# Patient Record
Sex: FEMALE | Race: WHITE
[De-identification: names, ages, dates, MRNs, and addresses within clinical notes are randomized per-mention and may not be internally consistent; named-entity substitution may affect disease eponyms.]

---

## 2018-04-29 ENCOUNTER — HOSPITAL ENCOUNTER (INPATIENT)
Dept: HOSPITAL 25 - ED | Age: 83
LOS: 4 days | Discharge: NURSING, REHAB OR LONG-TERM CARE | DRG: 481 | End: 2018-05-03
Attending: HOSPITALIST | Admitting: HOSPITALIST
Payer: MEDICARE

## 2018-04-29 DIAGNOSIS — Z87.820: ICD-10-CM
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
## 2018-04-29 DIAGNOSIS — F03.90: ICD-10-CM

## 2018-04-29 DIAGNOSIS — D62: ICD-10-CM

## 2018-04-29 DIAGNOSIS — I10: ICD-10-CM

## 2018-04-29 DIAGNOSIS — S72.22XA: Primary | ICD-10-CM

## 2018-04-29 DIAGNOSIS — Z72.89: ICD-10-CM

## 2018-04-29 DIAGNOSIS — Z66: ICD-10-CM

## 2018-04-29 DIAGNOSIS — E11.9: ICD-10-CM

## 2018-04-29 DIAGNOSIS — R42: ICD-10-CM
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
## 2018-04-29 DIAGNOSIS — I07.1: ICD-10-CM

## 2018-04-29 DIAGNOSIS — N17.9: ICD-10-CM

## 2018-04-29 LAB
BASOPHILS # BLD AUTO: 0.1 10^3/UL (ref 0–0.2)
EOSINOPHIL # BLD AUTO: 0.2 10^3/UL (ref 0–0.6)
HCT VFR BLD AUTO: 34 % (ref 35–47)
HGB BLD-MCNC: 10.9 G/DL (ref 12–16)
LYMPHOCYTES # BLD AUTO: 0.8 10^3/UL (ref 1–4.8)
MCH RBC QN AUTO: 19 PG (ref 27–31)
MCHC RBC AUTO-ENTMCNC: 32 G/DL (ref 31–36)
MCV RBC AUTO: 58 FL (ref 80–97)
MONOCYTES # BLD AUTO: 0.7 10^3/UL (ref 0–0.8)
NEUTROPHILS # BLD AUTO: 6.6 10^3/UL (ref 1.5–7.7)
NRBC # BLD AUTO: 0 10^3/UL
NRBC BLD QL AUTO: 0.1
PLATELET # BLD AUTO: 190 10^3/UL (ref 150–450)
RBC # BLD AUTO: 5.79 10^6/UL (ref 4–5.4)
WBC # BLD AUTO: 8.4 10^3/UL (ref 3.5–10.8)

## 2018-04-29 PROCEDURE — 86850 RBC ANTIBODY SCREEN: CPT

## 2018-04-29 PROCEDURE — 72170 X-RAY EXAM OF PELVIS: CPT

## 2018-04-29 PROCEDURE — 83036 HEMOGLOBIN GLYCOSYLATED A1C: CPT

## 2018-04-29 PROCEDURE — 86922 COMPATIBILITY TEST ANTIGLOB: CPT

## 2018-04-29 PROCEDURE — 86900 BLOOD TYPING SEROLOGIC ABO: CPT

## 2018-04-29 PROCEDURE — 85610 PROTHROMBIN TIME: CPT

## 2018-04-29 PROCEDURE — 93306 TTE W/DOPPLER COMPLETE: CPT

## 2018-04-29 PROCEDURE — 76001: CPT

## 2018-04-29 PROCEDURE — 82728 ASSAY OF FERRITIN: CPT

## 2018-04-29 PROCEDURE — 36415 COLL VENOUS BLD VENIPUNCTURE: CPT

## 2018-04-29 PROCEDURE — 83550 IRON BINDING TEST: CPT

## 2018-04-29 PROCEDURE — 83735 ASSAY OF MAGNESIUM: CPT

## 2018-04-29 PROCEDURE — 85730 THROMBOPLASTIN TIME PARTIAL: CPT

## 2018-04-29 PROCEDURE — 83540 ASSAY OF IRON: CPT

## 2018-04-29 PROCEDURE — 80048 BASIC METABOLIC PNL TOTAL CA: CPT

## 2018-04-29 PROCEDURE — 93005 ELECTROCARDIOGRAM TRACING: CPT

## 2018-04-29 PROCEDURE — 99284 EMERGENCY DEPT VISIT MOD MDM: CPT

## 2018-04-29 PROCEDURE — 80053 COMPREHEN METABOLIC PANEL: CPT

## 2018-04-29 PROCEDURE — 85025 COMPLETE CBC W/AUTO DIFF WBC: CPT

## 2018-04-29 PROCEDURE — C1776 JOINT DEVICE (IMPLANTABLE): HCPCS

## 2018-04-29 PROCEDURE — C1713 ANCHOR/SCREW BN/BN,TIS/BN: HCPCS

## 2018-04-29 PROCEDURE — 84484 ASSAY OF TROPONIN QUANT: CPT

## 2018-04-29 PROCEDURE — 86901 BLOOD TYPING SEROLOGIC RH(D): CPT

## 2018-04-29 PROCEDURE — 85060 BLOOD SMEAR INTERPRETATION: CPT

## 2018-04-29 PROCEDURE — 83880 ASSAY OF NATRIURETIC PEPTIDE: CPT

## 2018-04-29 PROCEDURE — P9040 RBC LEUKOREDUCED IRRADIATED: HCPCS

## 2018-04-29 RX ADMIN — LOSARTAN POTASSIUM SCH MG: 25 TABLET, FILM COATED ORAL at 22:13

## 2018-04-29 RX ADMIN — INSULIN LISPRO SCH UNIT: 100 INJECTION, SOLUTION INTRAVENOUS; SUBCUTANEOUS at 18:01

## 2018-04-29 RX ADMIN — METOPROLOL TARTRATE SCH MG: 25 TABLET, FILM COATED ORAL at 21:46

## 2018-04-29 RX ADMIN — METOPROLOL TARTRATE SCH: 25 TABLET, FILM COATED ORAL at 16:04

## 2018-04-29 RX ADMIN — MORPHINE SULFATE PRN MG: 4 INJECTION INTRAVENOUS at 22:06

## 2018-04-29 RX ADMIN — INSULIN LISPRO SCH UNIT: 100 INJECTION, SOLUTION INTRAVENOUS; SUBCUTANEOUS at 22:10

## 2018-04-29 RX ADMIN — OMEPRAZOLE SCH: 20 CAPSULE, DELAYED RELEASE ORAL at 16:04

## 2018-04-29 NOTE — ED
Lower Extremity





- HPI Summary


HPI Summary: 


Patient is an 82-year-old female who presents emergency department for left hip 

pain.  Patient has a history of dementia and currently lives by herself with 

the help of an aid and family.  Patient reportedly fell going to the mailbox on 

Friday, 3 days ago.  There is no report of head injury or loss of 

consciousness.  Patient was evaluated at [facility] for left hip pain and x-

rays were normal and patient was sent home.  Caregiver who is present states 

that patient has been unable to walk since fall secondary to left hip pain.  

Prior to fall she was ambulatory with or without a walker.  Caregiver states 

that today she was helping patient sit up when she heard a pop in her left hip 

and patient had increased pain.  Symptoms are moderate in severity.  Touching 

hip and walking makes symptoms worse.  Nothing makes symptoms better.  Patient 

received IV morphine and Zofran in the ambulance prior to arrival.








- History of Current Complaint


Chief Complaint: EDExtremityLower


Stated Complaint: HIP PAIN


Time Seen by Provider: 04/29/18 09:34


Hx Obtained From: Patient, Family/Caretaker


Hx From Patient Unobtainable Due To: Dementia


Pain Intensity: 10





- Allergies/Home Medications


Allergies/Adverse Reactions: 


 Allergies











Allergy/AdvReac Type Severity Reaction Status Date / Time


 


No Known Allergies Allergy   Verified 01/29/14 09:03











Home Medications: 


 Home Medications





Losartan TAB* [Cozaar TAB*] 50 mg PO BID 04/29/18 [History Confirmed 04/29/18]


Meclizine HCl [Dramamine Less Drowsy] 25 mg PO TID 04/29/18 [History Confirmed 

04/29/18]


Metoprolol Tartrate TAB* [Lopressor TAB*] 25 mg PO BID 04/29/18 [History 

Confirmed 04/29/18]


Omeprazole CAP* [Prilosec CAP* 20 MG] 40 mg PO DAILY 04/29/18 [History 

Confirmed 04/29/18]


Pioglitazone TAB* [Actos TAB*] 30 mg PO DAILY 04/29/18 [History Confirmed 04/29/ 18]


amLODIPine TAB* [Norvasc 5 mg TAB*] 5 mg PO DAILY 04/29/18 [History Confirmed 04 /29/18]











PMH/Surg Hx/FS Hx/Imm Hx


Previously Healthy: Yes


Infectious Disease History: No


Infectious Disease History: 


   Denies: Traveled Outside the [place] in Last 30 Days





- Social History


Occupation: Retired


Lives: Alone


Alcohol Use: Rare


Substance Use Type: Reports: None


Smoking Status (MU): Never Smoked Tobacco





Review of Systems


Constitutional: Negative


Eyes: Negative


ENT: Negative


Cardiovascular: Negative


Respiratory: Negative


Gastrointestinal: Negative


Genitourinary: Negative


Positive: Other - Left hip and left rib pain


Skin: Negative


Neurological: Negative


All Other Systems Reviewed And Are Negative: Yes





Physical Exam


Triage Information Reviewed: Yes


Vital Signs On Initial Exam: 


 Initial Vitals











Temp Pulse Resp BP Pulse Ox


 


 99.5 F   86   16   158/81   82 


 


 04/29/18 09:25  04/29/18 09:25  04/29/18 09:25  04/29/18 09:25  04/29/18 09:25











Vital Signs Reviewed: Yes


Appearance: Positive: Well-Appearing - Patient lying in bed in no acute 

distress.  Pleasantly confused.


Skin: Positive: Warm, Dry


Head/Face: Positive: Normal Head/Face Inspection


Eyes: Positive: Normal


Respiratory/Lung Sounds: Positive: Clear to Auscultation, Breath Sounds Present

, Other - Pain on palpation to the left lower anterior ribs.


Cardiovascular: Positive: Normal, RRR


Abdomen Description: Positive: Nontender


Musculoskeletal: Positive: Other - Left lower extremity is shortened and 

externally rotated.  Good palpable pedal pulse.





Diagnostics





- Vital Signs


 Vital Signs











  Temp Pulse Resp BP Pulse Ox


 


 04/29/18 09:53      95


 


 04/29/18 09:25  99.5 F  86  16  158/81  82














- Laboratory


Lab Results: 


 Lab Results











  04/29/18 04/29/18 04/29/18 Range/Units





  10:07 10:07 10:07 


 


WBC    8.4  (3.5-10.8)  10^3/ul


 


RBC    5.79 H  (4.0-5.4)  10^6/ul


 


Hgb    10.9 L  (12.0-16.0)  g/dl


 


Hct    34 L  (35-47)  %


 


MCV    58 L  (80-97)  fL


 


MCH    19 L  (27-31)  pg


 


MCHC    32  (31-36)  g/dl


 


RDW    17 H  (10.5-15)  %


 


Plt Count    Pending  


 


MPV    Pending  


 


Neut % (Auto)    Pending  


 


Lymph % (Auto)    Pending  


 


Mono % (Auto)    Pending  


 


Eos % (Auto)    Pending  


 


Baso % (Auto)    Pending  


 


Absolute Neuts (auto)    Pending  


 


Absolute Lymphs (auto)    Pending  


 


Absolute Monos (auto)    Pending  


 


Absolute Eos (auto)    Pending  


 


Absolute Basos (auto)    Pending  


 


Absolute Nucleated RBC    Pending  


 


Nucleated RBC %    Pending  


 


APTT  31.3    (26.0-36.3)  seconds


 


Sodium   136 L   (139-145)  mmol/L


 


Potassium   3.8   (3.5-5.0)  mmol/L


 


Chloride   104   (101-111)  mmol/L


 


Carbon Dioxide   24   (22-32)  mmol/L


 


Anion Gap   8   (2-11)  mmol/L


 


BUN   19   (6-24)  mg/dL


 


Creatinine   0.83   (0.51-0.95)  mg/dL


 


Est GFR ( Amer)   84.9   (>60)  


 


Est GFR (Non-Af Amer)   66.0   (>60)  


 


BUN/Creatinine Ratio   22.9 H   (8-20)  


 


Glucose   150 H   ()  mg/dL


 


Calcium   8.6   (8.6-10.3)  mg/dL


 


Total Bilirubin   0.80   (0.2-1.0)  mg/dL


 


AST   14   (13-39)  U/L


 


ALT   9   (7-52)  U/L


 


Alkaline Phosphatase   103   ()  U/L


 


Troponin I   0.01   (<0.04)  ng/mL


 


Total Protein   6.7   (6.4-8.9)  g/dL


 


Albumin   3.2   (3.2-5.2)  g/dL


 


Globulin   3.5   (2-4)  g/dL


 


Albumin/Globulin Ratio   0.9 L   (1-3)  











Result Diagrams: 


 04/29/18 10:07





 04/29/18 10:07


Lab Statement: Any lab studies that have been ordered have been reviewed, and 

results considered in the medical decision making process.





Lower Extremity Course/Dx





- Course


Course Of Treatment: Patient present with ongoing left hip pain and deformity 

after fall that occurred on Friday.  She is afebrile with stable vital signs.  

Patient received IV morphine en route to the ER.  Basic workup and x-rays were 

ordered.  Patient's caregiver who is present states she is at her baseline 

mental status.  Labs are unremarkable.  X-ray of the left hip shows a proximal 

femur fracture.  I spoke with on-call orthopedics, Dr. Winters, and he plans on 

taking patient to or tomorrow if she clears medically.  I spoke with hospitalist

, Dr. Winters, and pt. has been accepted to his service.





- Diagnoses


Differential Diagnosis/HQI/PQRI: Positive: Arthritis, Contusion, Dislocation, 

Fracture (Closed), Sciatica, Sprain, Strain


Provider Diagnoses: 


 Femur fracture








Discharge





- Sign-Out/Discharge


Documenting (check all that apply): Discharge/Admit/Transfer





- Discharge Plan


Condition: Stable


Disposition: ADMITTED TO [facility]





- Billing Disposition and Condition


Condition: STABLE


Disposition: [facility]P-[facility] [place] [facility]

## 2018-04-29 NOTE — RAD
Indication: LEFT femur pain post fall.



Comparison: No relevant prior exams available on the [facility] PACS for comparison.



Technique: AP pelvis and AP and oblique lateral views of the LEFT femur. Crosstable

lateral view femur at the hip.



Report: Subtrochanteric diaphyseal fracture of the LEFT femur with varus and apex anterior

angulation. Diffuse decreased bone density without suggestion of a predisposing focal

pathologic lesion at the intratrochanteric region of the femur. Small bone island at the

intratrochanteric region. Surrounding soft tissue swelling proximal to mid thigh. The LEFT

hip remains normally located.



No pelvic fracture or pelvic joint diastases.



IMPRESSION: Subtrochanteric diaphyseal fracture of the LEFT femur with varus and apex

anterior angulation.

## 2018-04-29 NOTE — CONS
CONSULTATION REPORT:

 

DATE OF CONSULT:  04/29/18

 

CHIEF COMPLAINT:  Left femur fracture.

 

HISTORY OF PRESENT ILLNESS:  Nilsa is 82 years old.  She has past medical 
history of hypertension, dementia, and noninsulin-dependent diabetes.  A couple 
of days ago, she had a fall on to a grassy area.  She was taken to [facility] where x- rays were reportedly done and per the information I have 
available to me, these were negative for any issues and so they sent her home.  
She continued to have left hip pain.  This morning, Abiliota her caregiver for 
many years and her power-of-  was noticed that the area was swollen and 
she decided something was wrong and so she brought her back in and a 
subtrochanteric femur fracture was identified. She was in severe pain.  She was 
admitted by the hospitalist service and I was consulted for her orthopedic 
care.  She is not complaining of pain in other areas.

 

PAST MEDICAL HISTORY:  Again taken from her chart.  This is positive for 
dementia, hypertension, diabetes.  Her H and P mentions fall in July 2006, 
which showed a little bit of intracranial bleeding and she was diagnosed with a 
brain contusion.  Repeat MRI of the brain on 07/2017 showed small subdural 
hematoma as well as a small left frontal hemorrhagic contusion and subcutaneous 
subgluteal hematoma on the right occipital lobe.  Repeat CT scan in July 18th 
showed no changes.

 

PAST SURGICAL HISTORY:  Not available to me.

 

MEDICATIONS:  These include:

1.  Omeprazole.

2.  Meclizine.

3.  Metoprolol.

4.  Amlodipine.

5.  Pioglitazone.

6.  Losartan.

 

ALLERGIES:  No known drug allergies.

 

FAMILY HISTORY:  Noncontributory.

 

SOCIAL HISTORY:  She is a former .  She lives with her partner, 
Francisco J.  He has schizophrenia and was recently in the [facility]. She is cared for by a caregiver, Shanta Eaton whose phone number 
is [phone].  As I understand that she has never smoked and does not have 
any issues with substance abuse.

 

REVIEW OF SYSTEMS:  This is largely unobtainable due to her slightly altered 
mental status secondary to the fracture pain and now extensive narcotic pain 
medication.

 

PHYSICAL EXAM:  General:  Awake, but did not really conversive.  Her vital 
signs are 98.5, 89, 143/63, 20, and 97% on room air.  HEENT:  Normocephalic, 
atraumatic. Neck is supple.  She moves it without any apparent pain.  
Respiratory:  She has normal respiratory effort.  Extremities:  The left lower 
extremity is significantly shortened and externally rotated.  She does not 
really comply with the neurological exam, but I do note that just grossly she 
seems to be wiggling her toes.  Skin: Some mild bruising.  No bleeding or 
lacerations.

 

DIAGNOSTIC STUDIES/LAB DATA:  Her white blood cell count 8.4, H and H 10.9 and 
34, platelet count is 190.  APTT is 31.3.  Sodium is 136, creatinine is 0.83, 
glucose is 150.  Liver enzymes are all within normal limits.

 

IMAGING:  X-rays of the pelvis and left femur show a displaced subtrochanteric 
femur fracture.

 

IMPRESSION:  Displaced left subtrochanteric femur fracture.  She likely 
fractured the femur a couple of days ago when she had the fall and now has 
displaced the fracture.

 

PLAN:  She has been admitted by the hospitalist service and appropriately been 
placed on bed rest and is receiving IV hydration and London catheter has been 
placed.  Dr. Tafoya with the hospitalist service is performing her medical 
optimization based off of his note, he says he may consider adding an 
echocardiogram, but otherwise she is medically optimized for surgery tomorrow.  
We will make her n.p.o. at midnight.  The plan will be for close versus open 
reduction of the left femur fracture with long gamma nail internal fixation. It 
sounds like her medical decision maker is Shanta Eaton, her power-of- 
, again the number is [phone].  She does not have any children.  
She does have a longstanding partner, Francisco J, but he has pretty extensive 
psychiatric illness and is recently discharged from the [facility].  We will review from all this in the morning.

 

 [id number] #: [id number]

DELFINA

## 2018-04-29 NOTE — HP
HISTORY AND PHYSICAL:

 

DATE OF ADMISSION:  04/29/18

 

ADMITTING PROVIDER:  Charles Tafoya MD

 

PRIMARY CARE PHYSICIAN:  Yimi Atkinson DO

 

CHIEF COMPLAINT:  Left hip pain.

 

HISTORY OF PRESENT ILLNESS:  Nilsa Mojica is an 82-year-old female with past 
medical history of hypertension, dementia, non-insulin-dependent diabetes 
mellitus, who had a non-observed fall 2 days prior to admission, she was 
walking from paved area into grass area after picking up her mail.  Her partner
, Miriam Harris was quickly on the scene.  She denies any loss of consciousness.
  She was taken to the ED at [facility] and had reportedly x-rays done 
that reportedly did not show any signs of fracture.  She was discharged home.  
She has had reduced mobility, has been taken care of by her care aide, Shanta Beckford, who is at bedside.  Day prior to admission, they attempted to get her 
to the side of the bed and then into her rolling walker, but she was unable to 
support herself and they could not as well. She was assisted to the ground and 
then eventually lifted back into the bed.  On day of admission, Alberta 
attempted to swing the patient's legs to the side of the bed to again try to 
help her sit on the side, but she immediately heard a pop and there was 
exquisite pain in the left hip area.  She was brought [place] [facility] 
and eventually had imaging consistent with femur fracture.  She was referred to 
hospitalist service for admission with planned operational management of her 
subtrochanteric diaphyseal fracture of the left femur with Dr. Winters on 04/30/
18.  The patient attests to 8-9/10 pain, may or may not have gotten some 
morphine with EMS (Alberta was told that she had gotten some at some point).  
She is a poor historian with history of dementia.  She denies history of falls, 
but gets meclizine 3 times a day for chronic dizziness.  Denies chest pain or 
shortness of breath currently, but is fairly immobile, walking slowly with a 
walker.  She has chills, but no fevers.  Chronically, Alberta attests to some 
chronic tenderness to palpation in the left upper quadrant and overall is "very 
sensitive," complaining of pain frequently in various locations.  She denies 
any cardiac history other than high blood pressure for which she is on 3 
agents.  Has never reportedly had an echo, no EKG in our system.

 

PAST MEDICAL HISTORY:  Dementia, high blood pressure, noninsulin-dependent 
diabetes mellitus.  She had a traumatic fall in July 2006, which showed "a 
little bit of intracerebral bleeding."  She was diagnosed with a brain 
contusion.  She had repeat MRI brain on 07/17/06, which showed a small subdural 
hematoma on the right side, small left frontal hemorrhagic contusion, 
subcutaneous subgaleal hematoma of the right occipital lobe.  Repeat CT on 07/18
/06 showed no changes.

 

MEDICATIONS:  Include:

1.  Omeprazole 40 mg daily.

2.  Meclizine 25 mg 3 times a day.

3.  Metoprolol tartrate 25 mg twice a day.

4.  Amlodipine 5 mg once a day.

5.  Pioglitazone 30 mg once a day.

6.  Losartan 50 mg twice a day.

 

ALLERGIES:  No known drug allergies.

 

FAMILY HISTORY:  The patient was raised in a [facility] after given up 
for adoption.  Does not know much about her 6 biological siblings, the one of 
which was in a nursing home and a half-brother who was contacted few years ago 
by Alberta.

 

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 
furniture store operation.  She is a never smoker.  Occasionally had small 
amounts of alcohol, but none recently.  She lives with Francisco J Harris, who is her 
partner who is recently discharged from [facility], history of 
schizophrenia, age 78.  Shanta Beckford is her power of .  She 
reportedly is a DNR/DNI. MOLST form is not present on admission.

 

REVIEW OF SYSTEMS:  A complete 14-point review of systems negative except as 
per HPI.  She is a poor historian overall.  She does have a history of what was 
thought to be perhaps urinary bleeding and had a workup with Dr. Turpin in 
the past including a CT urogram in 2014.  Denies history of colonoscopy as does 
Alberta. She had cholelithiasis on the CT urogram on 01/29/14.

 

                               PHYSICAL EXAMINATION

 

GENERAL:  Moderate distress intermittent, lying in hospital bed.

 

VITAL SIGNS:  Temperature 99.5; pulse rate 78 to 84; respiratory rate 16 to 20; 
initially oxygen sat was 82% on room air, now 92% on 2 L; blood pressure 158/81
, now 124/57.

 

HEENT:  Normocephalic, atraumatic.  Pupils are equal, round, and reactive to 
light. No scleral icterus.

 

NECK:  No cervical lymphadenopathy.  Neck is supple.

 

RESPIRATORY:  Anteriorly clear to auscultation bilaterally with no wheezing, 
rales, or rhonchi.

 

CARDIOVASCULAR:  Regular rate and rhythm.  No murmurs, rubs, or gallops.

 

ABDOMEN:  Soft, nondistended, slightly tender in the left upper quadrant.

 

EXTREMITIES:  Warm, well perfused.  There is foreshortening of the left 
extremity.

 

NEURO:  Sensation intact.  Wiggles both toes.   strength intact.  She is 
oriented to name, but not year or place.  She thinks she is in an apartment 
building. Memory is not great, though she does recognize Alberta by name.

 

SKIN:  No lesions.  No rashes.

 

 LABORATORY DATA:  White count 8.4, hemoglobin 10.9, hematocrit 34, MCV 58, 
platelets 190.  Sodium 136, potassium 3.8, chloride 104, carbon dioxide 24, BUN 
19, creatinine 0.83, glucose 150.  AST 14, ALT 9, alk phos 103.  Troponin 0.01.

 

IMAGING:  Pelvis x-ray demonstrated subtrochanteric diaphyseal fracture of the 
left femur with varus and apex anterior angulation.  Ribs with chest x-ray 
demonstrated no left rib fracture.  There is a 4 mm potentially calcified 
nodule at the periphery of the right lower lung zone, stigmata of COPD, left 
basilar airspace consolidation may represent atelectasis or inflammatory 
infiltrate.  The femur x- ray on the left, which again showed subtrochanteric 
diaphyseal fracture of the left femur with varus and apex anterior angulation.

 

ASSESSMENT AND PLAN:  Nilsa Mojica is an 82-year-old female with history of 
significant dementia, hypertension, non-insulin-dependent diabetes mellitus.  
Her functional status seems somewhat limited.  It is not clear that she can 
achieve 4 METs.  An EKG was obtained, which shows T-wave inversion in V1, 
normal sinus rhythm, normal axis, poor R-wave progression, no ST elevations or 
changes.  I will try to obtain records from Dr. Atkinson' office.  Her last A1c was 
6.3 in November 2017.  We will repeat that.  Her last LDL was 102, HDL was 43.  
We will consider adding an echocardiogram given her limited ability (secondary 
to dementia?) to go upstairs or probably achieve 4 METs of activity given her 
high blood pressure and diabetes.  Otherwise, she is medically optimized for 
surgery with Dr. Winters tomorrow.  We will get point of care glucose testing 
q.a.c. q.h.s. with sliding scale insulin.  Continue her metoprolol, losartan 
and Norvasc.  Hold her pioglitazone.  Continue her omeprazole.  Her medical 
surrogate is Alberta Shakeel. We will have to get the MOLST from home or fill 
out a new one.  She is a DNR/DNI. She can eat a carbohydrate consistent heart-
healthy diet.  Physical therapy will have to be onboard after the surgery.  She 
gets significant 7-day a week help from home health aides, but likely will need 
SNF home going.  She also has a history of what seems like anemia at the ER 
labs with severe microcytosis.  I am adding on iron panel, ferritin level, 
concern for possible thalassemia, but we will await for further records from 
Dr. Atkinson before potentially doing any hemoglobin electrophoresis.  She is being 
admitted to the inpatient status.

 

 

 

[id number] #: [id number]

DELFINA

## 2018-04-29 NOTE — RAD
Indication: LEFT rib pain post fall.



Comparison: No relevant prior exams available on the [facility] PACS for comparison.



Technique: Frontal chest and 2 dedicated LEFT rib views obtained. No oblique LEFT rib view

could be obtained due to limitation in patient positioning.



Report: Elevated lung volumes and moderate prominence of interstitial markings. Suggestion

of a 4 mm potentially calcified nodule at the periphery of the RIGHT lower lung zone. Mild

elevation of the LEFT hemidiaphragm. LEFT basilar airspace consolidation may represent

atelectasis or inflammatory infiltrate. Negative for pleural effusion or pneumothorax.

Cardiomegaly. Unremarkable central pulmonary vasculature. Mildly tortuous thoracic aorta.

Unremarkable soft tissue contours.



IMPRESSION: 

1. No LEFT rib fracture or pneumothorax evident.

2. Suggestion of a 4 mm potentially calcified nodule at the periphery of the RIGHT lower

lung zone. In absence of prior exams to document stability a nonemergent follow-up chest

CT would be suggested for further assessment. The patient may have had previous

radiographs have [facility].

3. Stigmata of chronic obstructive pulmonary disease.

4. LEFT basilar airspace consolidation may represent atelectasis or inflammatory

infiltrate.

## 2018-04-29 NOTE — PN
Progress Note





- Progress Note


Date of Service: 04/29/18


Note: 





Full note dictated.





Displaced L subtrochanteric femur fracture. Dr. Tafoya doing medical 

optimization. It sounds like she will be ready for tomorrow so npo at midnight 

and plan for closed versus open reduction and internal fixation with a long 

gamma nail.

## 2018-04-29 NOTE — RAD
Indication: LEFT femur pain post fall.



Comparison: No relevant prior exams available [place] the [facility] [place].



Technique: AP pelvis and AP and oblique lateral views of the LEFT femur. Crosstable

lateral view femur at the hip.



Report: Subtrochanteric diaphyseal fracture of the LEFT femur with varus and apex anterior

angulation. Diffuse decreased bone density without suggestion of a predisposing focal

pathologic lesion at the intratrochanteric region of the femur. Small bone island at the

intratrochanteric region. Surrounding soft tissue swelling proximal to mid thigh. The LEFT

hip remains normally located.



No pelvic fracture or pelvic joint diastases.



IMPRESSION: Subtrochanteric diaphyseal fracture of the LEFT femur with varus and apex

anterior angulation.

## 2018-04-30 LAB
BASOPHILS # BLD AUTO: 0.1 10^3/UL (ref 0–0.2)
EOSINOPHIL # BLD AUTO: 0.3 10^3/UL (ref 0–0.6)
HCT VFR BLD AUTO: 34 % (ref 35–47)
HGB BLD-MCNC: 10.9 G/DL (ref 12–16)
LYMPHOCYTES # BLD AUTO: 1.4 10^3/UL (ref 1–4.8)
MCH RBC QN AUTO: 19 PG (ref 27–31)
MCHC RBC AUTO-ENTMCNC: 32 G/DL (ref 31–36)
MCV RBC AUTO: 59 FL (ref 80–97)
MONOCYTES # BLD AUTO: 0.9 10^3/UL (ref 0–0.8)
NEUTROPHILS # BLD AUTO: 6.3 10^3/UL (ref 1.5–7.7)
NRBC # BLD AUTO: 0 10^3/UL
NRBC BLD QL AUTO: 0.1
PLATELET # BLD AUTO: 192 10^3/UL (ref 150–450)
RBC # BLD AUTO: 5.67 10^6/UL (ref 4–5.4)
WBC # BLD AUTO: 9 10^3/UL (ref 3.5–10.8)

## 2018-04-30 PROCEDURE — 0QS704Z REPOSITION LEFT UPPER FEMUR WITH INTERNAL FIXATION DEVICE, OPEN APPROACH: ICD-10-PCS | Performed by: ORTHOPAEDIC SURGERY

## 2018-04-30 RX ADMIN — MORPHINE SULFATE PRN MG: 4 INJECTION INTRAVENOUS at 10:27

## 2018-04-30 RX ADMIN — MORPHINE SULFATE PRN MG: 4 INJECTION INTRAVENOUS at 06:35

## 2018-04-30 RX ADMIN — METOPROLOL TARTRATE SCH MG: 25 TABLET, FILM COATED ORAL at 10:23

## 2018-04-30 RX ADMIN — INSULIN LISPRO SCH: 100 INJECTION, SOLUTION INTRAVENOUS; SUBCUTANEOUS at 17:50

## 2018-04-30 RX ADMIN — METOPROLOL TARTRATE SCH: 25 TABLET, FILM COATED ORAL at 21:03

## 2018-04-30 RX ADMIN — OMEPRAZOLE SCH: 20 CAPSULE, DELAYED RELEASE ORAL at 10:16

## 2018-04-30 RX ADMIN — INSULIN LISPRO SCH: 100 INJECTION, SOLUTION INTRAVENOUS; SUBCUTANEOUS at 11:38

## 2018-04-30 RX ADMIN — LOSARTAN POTASSIUM SCH: 25 TABLET, FILM COATED ORAL at 10:15

## 2018-04-30 RX ADMIN — CEFAZOLIN SODIUM SCH MLS/HR: 1 SOLUTION INTRAVENOUS at 21:01

## 2018-04-30 RX ADMIN — MORPHINE SULFATE PRN MG: 4 INJECTION INTRAVENOUS at 03:39

## 2018-04-30 RX ADMIN — AMLODIPINE BESYLATE SCH: 5 TABLET ORAL at 10:15

## 2018-04-30 RX ADMIN — INSULIN LISPRO SCH: 100 INJECTION, SOLUTION INTRAVENOUS; SUBCUTANEOUS at 21:02

## 2018-04-30 RX ADMIN — LOSARTAN POTASSIUM SCH: 25 TABLET, FILM COATED ORAL at 21:03

## 2018-04-30 RX ADMIN — INSULIN LISPRO SCH: 100 INJECTION, SOLUTION INTRAVENOUS; SUBCUTANEOUS at 07:18

## 2018-04-30 NOTE — RAD
INDICATION: Left femur fracture, fall



COMPARISONS: April 29, 2018



TECHNIQUE: Fluoroscopy was provided for a surgical procedure. Total fluoroscopy time is:

80 seconds



FINDINGS: Spot images demonstrate internal fixation of the femur.



IMPRESSION: FLUOROSCOPY WAS PROVIDED FOR A SURGICAL PROCEDURE



CPT II Codes:

## 2018-04-30 NOTE — PN
Hospitalist Progress Note


Date of Service: 04/30/18








ECHO back, preserved EF, abnormal diastolic dysfunction, mild Tricuspid Valve 

Regurg. 


Patient cleared medically for left femur fracture surgery.

## 2018-04-30 NOTE — OP
DATE OF OPERATION:  04/30/18 - ROOM #334

 

DATE OF BIRTH:  [birth year]

 

SURGEON:  Lavell Ibarra MD

 

ASSISTANT:  Ileana Mobley PA-C

 

PRE-OP DIAGNOSIS:  Left subtrochanteric hip fracture.

 

POST-OP DIAGNOSIS:  Left subtrochanteric hip fracture.

 

OPERATIVE PROCEDURE:  TFN nailing, left hip.

 

DESCRIPTION OF PROCEDURE:  The patient was taken to the operating room where 
noninvasive longitudinal traction was applied.  We opened up a 6-cm 
longitudinal incision centered on the greater trochanter.  I divided the deep 
fascia just above the trochanter, passed the starting pin, the starting reamer, 
and passing all through the proximal femur up to the mouth of the femoral 
shaft.  We then passed the guide pin all the way down to the knee area.  We 
measured a 360-cm length nail. We then reamed up to a 13.5 and placed a 12-mm 
diameter by 360 mm length femoral nail.  With the proximal targeting device, we 
were able to pass the guide pin and proximally up into the center of the 
femoral head and an appropriate length spiral screw then was driven up through 
the nail into the center of the head.  We locked this proximally.

 

This area of the wound then was closed with deep #1 Vicryl sutures for the 
fascial incision, some 0 Vicryl sutures for the subcutaneous tissue and staples 
for the skin.

 

Distally, we were able to identify the 2 transverse locking holes and the 
distal plate.  We drilled over using the image intensifier, placing a 42-mm and 
then a 36- mm screw, and proper positioning was noted on the AP and lateral 
views.  These wounds were irrigated and closed with 2-0 Prolene sutures.  
Compression dressing was then applied.

 

BLOOD LOSS:  250 mL.

 

 [id number]/[facility] #: [id number]

[facility]

## 2018-04-30 NOTE — PN
Subjective


Date of Service: 04/30/18


Interval History: 








ECHO back, preserved EF, abnormal diastolic dysfunction, mild Tricuspid Valve 

Regurg. 


Patient cleared medically for left femur fracture surgery. Medically cleared 

for surgery. 


Pain moderately controlled. 











Objective


Active Medications: 








Amlodipine Besylate (Norvasc Tab*)  5 mg PO DAILY [facility]


   Last Admin: 04/30/18 10:15 Dose:  Not Given


Dextrose (D50w Syringe 50 Ml*)  12.5 gm IV PUSH .FOR FS < 60 - SS PRN


   PRN Reason: FS < 60


Cefazolin Sodium/Dextrose (Kefzol 1 Gm In Dextrose Duplex (*))  1 gm in 50 mls 

@ 200 mls/hr IVPB Q8H [facility]


   Stop: 05/01/18 13:14


Insulin Human Lispro (Humalog*)  0 units SUBCUT ACHS [facility]


   PRN Reason: Protocol


   Last Admin: 04/30/18 17:50 Dose:  Not Given


Losartan Potassium (Cozaar Tab*)  50 mg PO BID [facility]


   Last Admin: 04/30/18 10:15 Dose:  Not Given


Metoprolol Tartrate (Lopressor Tab*)  25 mg PO BID [facility]


   Last Admin: 04/30/18 10:23 Dose:  25 mg


Morphine Sulfate (Morphine Vial*)  2 mg IV Q3H PRN


   PRN Reason: PAIN - MILD


   Last Admin: 04/30/18 10:27 Dose:  2 mg


Naloxone HCl (Narcan*)  0.08 mg IV Q2M PRN


   PRN Reason: severe induced resp depression


   Stop: 05/01/18 14:53


Omeprazole (Prilosec Cap*)  40 mg PO DAILY [facility]


   Last Admin: 04/30/18 10:16 Dose:  Not Given


Ondansetron HCl (Zofran Inj*)  4 mg IV Q4H PRN


   PRN Reason: NAUSEA


   Last Admin: 04/29/18 18:05 Dose:  4 mg








 Vital Signs - 8 hr











  04/30/18 04/30/18 04/30/18





  15:36 15:37 15:40


 


Temperature  98.1 F 


 


Pulse Rate 77 75 74


 


Respiratory 16 17 13





Rate   


 


Blood Pressure  146/71 





(mmHg)   


 


O2 Sat by Pulse 87 89 91





Oximetry   














  04/30/18 04/30/18 04/30/18





  15:41 15:45 15:46


 


Temperature   


 


Pulse Rate 75 67 66


 


Respiratory 19 18 10





Rate   


 


Blood Pressure 146/68  123/65





(mmHg)   


 


O2 Sat by Pulse 90 96 95





Oximetry   














  04/30/18 04/30/18 04/30/18





  15:50 15:51 15:55


 


Temperature   


 


Pulse Rate 66 63 66


 


Respiratory 10 18 13





Rate   


 


Blood Pressure  109/51 





(mmHg)   


 


O2 Sat by Pulse 91 91 91





Oximetry   














  04/30/18 04/30/18 04/30/18





  15:56 16:00 16:01


 


Temperature   


 


Pulse Rate 70 69 63


 


Respiratory 11 23 12





Rate   


 


Blood Pressure 110/47  99/44





(mmHg)   


 


O2 Sat by Pulse 90 91 91





Oximetry   














  04/30/18 04/30/18 04/30/18





  16:05 16:13 16:14


 


Temperature   


 


Pulse Rate 62 63 61


 


Respiratory 17 19 18





Rate   


 


Blood Pressure 107/48  





(mmHg)   


 


O2 Sat by Pulse 92 91 91





Oximetry   














  04/30/18 04/30/18 04/30/18





  16:18 16:20 16:25


 


Temperature   


 


Pulse Rate 65 62 64


 


Respiratory 20 17 19





Rate   


 


Blood Pressure 113/71  





(mmHg)   


 


O2 Sat by Pulse 94 93 96





Oximetry   














  04/30/18 04/30/18 04/30/18





  16:30 16:45 16:50


 


Temperature   


 


Pulse Rate 65 63 70


 


Respiratory 17 15 17





Rate   


 


Blood Pressure 106/64  





(mmHg)   


 


O2 Sat by Pulse 90 94 94





Oximetry   














  04/30/18 04/30/18 04/30/18





  16:55 17:00 17:15


 


Temperature   


 


Pulse Rate 61  63


 


Respiratory 13 15 18





Rate   


 


Blood Pressure  115/59 





(mmHg)   


 


O2 Sat by Pulse 91 95 95





Oximetry   














  04/30/18 04/30/18 04/30/18





  17:16 17:30 17:31


 


Temperature   


 


Pulse Rate 59 56 67


 


Respiratory 20 9 16





Rate   


 


Blood Pressure 107/52  105/52





(mmHg)   


 


O2 Sat by Pulse 94 98 98





Oximetry   














  04/30/18 04/30/18 04/30/18





  17:45 17:46 18:07


 


Temperature   97.1 F


 


Pulse Rate 58 62 69


 


Respiratory 18 17 18





Rate   


 


Blood Pressure  107/57 101/54





(mmHg)   


 


O2 Sat by Pulse 98 96 93





Oximetry   














  04/30/18





  19:04


 


Temperature 96.9 F


 


Pulse Rate 65


 


Respiratory 16





Rate 


 


Blood Pressure 94/45





(mmHg) 


 


O2 Sat by Pulse 98





Oximetry 











Oxygen Devices in Use Now: Nasal Cannula


Appearance: NAD


Eyes: No Scleral Icterus, PERRLA


Neck: NL Appearance and Movements; NL JVP, Trachea Midline


Respiratory: Symmetrical Chest Expansion and Respiratory Effort, Clear to 

Auscultation


Cardiovascular: NL Sounds; No Murmurs; No JVD


Abdominal: NL Sounds; No Tenderness; No Distention, No Hepatosplenomegaly


Lymphatic: No Cervical Adenopathy


Extremities: No Edema, No Clubbing, Cyanosis, - - left leg shortened


Skin: No Rash or Ulcers, No Nodules or Sclerosis


Neurological: NL Sensation, - - oriented to name, but not place


Nutrition: Taking PO's


Result Diagrams: 


 04/30/18 05:08





 04/30/18 05:08


Additional Lab and Data: 


 





 Laboratory Results - last 24 hr











  04/29/18 04/30/18 04/30/18





  10:07 05:08 05:08


 


WBC   9.0 


 


RBC   5.67 H 


 


Hgb   10.9 L 


 


Hct   34 L 


 


MCV   59 L 


 


MCH   19 L 


 


MCHC   32 


 


RDW   17 H 


 


Plt Count   192 


 


MPV   9.1 


 


Neut % (Auto)   70.5 


 


Lymph % (Auto)   15.4 L 


 


Mono % (Auto)   10.4 H 


 


Eos % (Auto)   3.1 


 


Baso % (Auto)   0.6 


 


Absolute Neuts (auto)   6.3 


 


Absolute Lymphs (auto)   1.4 


 


Absolute Monos (auto)   0.9 H 


 


Absolute Eos (auto)   0.3 


 


Absolute Basos (auto)   0.1 


 


Absolute Nucleated RBC   0 


 


Nucleated RBC %   0.1 


 


Hem Pathologist Commnt    


 


Sodium    138 L


 


Potassium    4.1


 


Chloride    103


 


Carbon Dioxide    27


 


Anion Gap    8


 


BUN    23


 


Creatinine    0.82


 


Est GFR ( Amer)    86.0


 


Est GFR (Non-Af Amer)    66.9


 


BUN/Creatinine Ratio    28.0 H


 


Glucose    116 H


 


POC Glucose (mg/dL)   


 


Calcium    8.9


 


Iron    29 L


 


TIBC    279


 


% Saturation    10 L


 


Unsat Iron Binding    250


 


Transferrin    199 L


 


Ferritin    147.5














  04/30/18 04/30/18





  12:29 15:39


 


WBC  


 


RBC  


 


Hgb  


 


Hct  


 


MCV  


 


MCH  


 


MCHC  


 


RDW  


 


Plt Count  


 


MPV  


 


Neut % (Auto)  


 


Lymph % (Auto)  


 


Mono % (Auto)  


 


Eos % (Auto)  


 


Baso % (Auto)  


 


Absolute Neuts (auto)  


 


Absolute Lymphs (auto)  


 


Absolute Monos (auto)  


 


Absolute Eos (auto)  


 


Absolute Basos (auto)  


 


Absolute Nucleated RBC  


 


Nucleated RBC %  


 


Hem Pathologist Commnt  


 


Sodium  


 


Potassium  


 


Chloride  


 


Carbon Dioxide  


 


Anion Gap  


 


BUN  


 


Creatinine  


 


Est GFR ( Amer)  


 


Est GFR (Non-Af Amer)  


 


BUN/Creatinine Ratio  


 


Glucose  


 


POC Glucose (mg/dL)  132 H  143 H


 


Calcium  


 


Iron  


 


TIBC  


 


% Saturation  


 


Unsat Iron Binding  


 


Transferrin  


 


Ferritin  




















Assess/Plan/Problems-Billing


Assessment: 





83 yo female PMH severe dementia, HTN, NIDDM, p/w left femur fracture. 

diastolic dysfunction











- Patient Problems


(1) Femur fracture, left


Current Visit: Yes   Status: Acute   Code(s): S72.92XA - UNSP FRACTURE OF LEFT 

FEMUR, INIT ENCNTR FOR CLOSED FRACTURE   SNOMED Code(s): 72933943


   Comment: appreciate ortho assistance


cleared medically for OR. RCRI 0-1(some diastolic dysfunction on ECHO, NIDDM), 

low risk for major cardiac event)


PT


likely to SNF   





(2) Diabetes mellitus


Current Visit: Yes   Status: Acute   Code(s): E11.9 - TYPE 2 DIABETES MELLITUS 

WITHOUT COMPLICATIONS   SNOMED Code(s): 43263828


   Comment: A1C 7.5


POC qachs


holding home pioglitizone


SSI    





(3) HTN (hypertension)


Current Visit: Yes   Status: Acute   Code(s): I10 - ESSENTIAL (PRIMARY) 

HYPERTENSION   SNOMED Code(s): 78909606


   Comment: amlodipine 5mg


metoprolol 25mg BID   





(4) Severe dementia


Current Visit: Yes   Status: Acute   Code(s): F03.90 - UNSPECIFIED DEMENTIA 

WITHOUT BEHAVIORAL DISTURBANCE   SNOMED Code(s): 48962330


   Comment: has home aides 7 days a week.    





(5) Diastolic dysfunction


Current Visit: Yes   Status: Acute   Code(s): I51.9 - HEART DISEASE, 

UNSPECIFIED   SNOMED Code(s): 8234862


   Comment: not in acute exacerbation. now Hx of CHF.    


Status and Disposition: 





medicine inpatient.

## 2018-04-30 NOTE — ECHO
Patient:      NOLA BARROW

Med Rec#:     [id number]            :          1935          

Date:         2018            Age:          82y                 

Account#:     [id number]          Height:       157.48 cm / 62.0 in

Accession#:   [id number]           Weight:       65.77 kg / 145.0 lbs

Sex:          F                     BSA:          1.67

Room#:        334                   

Admit Date#:  2018          

Type:         Inpatient

 

Referring:    Charles Tafoya

Reading:      Mango Craven MD

Sonographer:  Teresita Martinez RDCS

CC:           Yimi Atkinson DO

______________________________________________________________________

 

Transthoracic Echocardiogram

 

Indication:

Abn EKG

BP:           121/65

HR:           88

Rhythm:       NSR

 

Findings     

History:

Prior fall with subdural hematoma and brain

contusion,dementia,HTN,admitted after a fall that fractured a hip. 

 

Technical Comments:

The study is technically difficult.   Completed at 0815. The study was

technically limited due to the patient's inability to lay in the left

lateral decubitus position.  

 

Left Ventricle:

The left ventricular chamber size is decreased. Global left ventricular

wall motion and contractility are within normal limits. There is normal

left ventricular systolic function. The estimated ejection fraction is

55-60%.  Abnormal left ventricular diastolic function is observed. 

 

Left Atrium:

The left atrial chamber size is normal. 

 

Right Ventricle:

The right ventricular cavity size is normal. The right ventricular

global systolic function is normal. 

 

Right Atrium:

The right atrium is not well visualized. 

 

Aortic Valve:

The aortic valve is trileaflet. There is no evidence of aortic

regurgitation. There is no evidence of aortic stenosis. 

 

Mitral Valve:

The mitral valve leaflets are mildly thickened. There is no evidence of

mitral regurgitation. There is no evidence of mitral stenosis. 

 

Tricuspid Valve:

The tricuspid valve leaflets are normal.  There is mild tricuspid

regurgitation. Unable to estimate the right ventricular systolic

pressure.   There is no tricuspid stenosis. 

 

Pulmonic Valve:

The pulmonic valve appears normal in structure and function. The

pulmonic valve appears normal. 

 

Pericardium:

The pericardium appears normal. 

 

Aorta:

There is no dilatation of the ascending aorta. There is no dilatation of

the aortic arch. There is no dilation of the aortic root. 

 

Pulmonary Artery:

The main pulmonary artery appears normal. 

 

Venous:

The venous system is not well visualized. 

 

Conclusions

Global left ventricular wall motion and contractility are within normal

limits.

There is normal left ventricular systolic function.

The estimated ejection fraction is 55-60%. 

The right ventricular global systolic function is normal.

There is no evidence of aortic regurgitation.

There is no evidence of mitral regurgitation.

There is mild tricuspid regurgitation.

Unable to estimate the right ventricular systolic pressure.  

There is no dilatation of the ascending aorta.

 

Measurements     

Name                    Value         Normal Range            

RVIDd (AP) 2D           2.8 cm        (0.9 - 2.6)             

IVSd (2D)               0.7 cm        (0.6 - 1)               

LVPWd (2D)              0.8 cm        (0.6 - 1)               

LVIDd (2D)              3.5 cm        (3.6 - 5.4)             

LVIDs (2D)              2.5 cm        -                        

LV FS (2D)              30 %          (25 - 45)               

Aortic Annulus          1.9 cm        (1.4 - 2.6)             

Ao root diameter (2D)   3.1 cm        (2.1 - 3.5)             

Ascending Ao            3 cm          (2.1 - 3.4)             

Aortic arch             2.2 cm        (1.8 - 3.4)             

Descending Ao           0.4 cm        -                        

LA dimension (AP) 2D    2.6 cm        (2.3 - 3.8)             

 

Name                    Value         Normal Range            

MV E-wave Vmax          0.7 m/sec     -                        

MV deceleration time    252 msec      -                        

MV A-wave Vmax          1.1 m/sec     -                        

MV E:A ratio            0.66 ratio    -                        

LV septal e' Vmax       0.06 m/sec    -                        

LV lateral e' Vmax      0.08 m/sec    -                        

LV E:e' septal ratio    11.66 ratio   -                        

LV E:e' lateral ratio   8.75 ratio    -                        

 

Name                    Value         Normal Range            

AV Vmax                 1.4 m/sec     -                        

AV VTI                  33 cm         -                        

AV peak gradient        7.42 mmHg     -                        

AV mean gradient        3.36 mmHg     -                        

LVOT Vmax               1.2 m/sec     -                        

LVOT VTI                28.3 cm       -                        

LVOT peak gradient      5.8 mmHg      -                        

LVOT mean gradient      2.25 mmHg     -                        

 

Name                    Value         Normal Range            

TR Vmax                 3.1 m/sec     -                        

TR peak gradient        37 mmHg       -                        

 

Name                    Value         Normal Range            

PV Vmax                 0.9 m/sec     -                        

PV peak gradient        2.94 mmHg     -                        

 

Electronically signed by: Mango Craven MD on 2018 08:34:57

## 2018-05-01 LAB
BASOPHILS # BLD AUTO: 0 10^3/UL (ref 0–0.2)
EOSINOPHIL # BLD AUTO: 0 10^3/UL (ref 0–0.6)
HCT VFR BLD AUTO: 27 % (ref 35–47)
HGB BLD-MCNC: 8.5 G/DL (ref 12–16)
LYMPHOCYTES # BLD AUTO: 0.5 10^3/UL (ref 1–4.8)
MCH RBC QN AUTO: 19 PG (ref 27–31)
MCHC RBC AUTO-ENTMCNC: 31 G/DL (ref 31–36)
MCV RBC AUTO: 59 FL (ref 80–97)
MONOCYTES # BLD AUTO: 0.6 10^3/UL (ref 0–0.8)
NEUTROPHILS # BLD AUTO: 7.3 10^3/UL (ref 1.5–7.7)
NRBC # BLD AUTO: 0 10^3/UL
NRBC BLD QL AUTO: 0.1
PLATELET # BLD AUTO: 148 10^3/UL (ref 150–450)
RBC # BLD AUTO: 4.6 10^6/UL (ref 4–5.4)
WBC # BLD AUTO: 8.5 10^3/UL (ref 3.5–10.8)

## 2018-05-01 RX ADMIN — INSULIN LISPRO SCH UNIT: 100 INJECTION, SOLUTION INTRAVENOUS; SUBCUTANEOUS at 18:22

## 2018-05-01 RX ADMIN — OXYCODONE HYDROCHLORIDE AND ACETAMINOPHEN PRN TAB: 5; 325 TABLET ORAL at 13:26

## 2018-05-01 RX ADMIN — INSULIN LISPRO SCH UNIT: 100 INJECTION, SOLUTION INTRAVENOUS; SUBCUTANEOUS at 13:17

## 2018-05-01 RX ADMIN — OMEPRAZOLE SCH MG: 20 CAPSULE, DELAYED RELEASE ORAL at 08:54

## 2018-05-01 RX ADMIN — AMLODIPINE BESYLATE SCH MG: 5 TABLET ORAL at 08:51

## 2018-05-01 RX ADMIN — LOSARTAN POTASSIUM SCH MG: 25 TABLET, FILM COATED ORAL at 08:49

## 2018-05-01 RX ADMIN — ENOXAPARIN SODIUM SCH MG: 30 INJECTION SUBCUTANEOUS at 21:48

## 2018-05-01 RX ADMIN — INSULIN LISPRO SCH UNIT: 100 INJECTION, SOLUTION INTRAVENOUS; SUBCUTANEOUS at 21:30

## 2018-05-01 RX ADMIN — CEFAZOLIN SODIUM SCH MLS/HR: 1 SOLUTION INTRAVENOUS at 13:27

## 2018-05-01 RX ADMIN — POLYETHYLENE GLYCOL 3350 PRN GM: 17 POWDER, FOR SOLUTION ORAL at 08:48

## 2018-05-01 RX ADMIN — INSULIN LISPRO SCH UNIT: 100 INJECTION, SOLUTION INTRAVENOUS; SUBCUTANEOUS at 08:55

## 2018-05-01 RX ADMIN — SODIUM CHLORIDE SCH MLS/HR: 900 IRRIGANT IRRIGATION at 07:40

## 2018-05-01 RX ADMIN — OXYCODONE HYDROCHLORIDE AND ACETAMINOPHEN PRN TAB: 5; 325 TABLET ORAL at 08:49

## 2018-05-01 RX ADMIN — MORPHINE SULFATE PRN MG: 4 INJECTION INTRAVENOUS at 06:13

## 2018-05-01 RX ADMIN — OXYCODONE HYDROCHLORIDE AND ACETAMINOPHEN PRN TAB: 5; 325 TABLET ORAL at 17:37

## 2018-05-01 RX ADMIN — SODIUM CHLORIDE SCH MLS/HR: 900 IRRIGANT IRRIGATION at 21:33

## 2018-05-01 RX ADMIN — METOPROLOL TARTRATE SCH MG: 25 TABLET, FILM COATED ORAL at 08:52

## 2018-05-01 RX ADMIN — SENNOSIDES SCH TAB: 8.6 TABLET, FILM COATED ORAL at 08:52

## 2018-05-01 RX ADMIN — DOCUSATE SODIUM SCH MG: 100 CAPSULE, LIQUID FILLED ORAL at 08:52

## 2018-05-01 RX ADMIN — OXYCODONE HYDROCHLORIDE AND ACETAMINOPHEN PRN TAB: 5; 325 TABLET ORAL at 21:29

## 2018-05-01 RX ADMIN — CEFAZOLIN SODIUM SCH MLS/HR: 1 SOLUTION INTRAVENOUS at 05:20

## 2018-05-01 RX ADMIN — METOPROLOL TARTRATE SCH MG: 25 TABLET, FILM COATED ORAL at 19:38

## 2018-05-01 NOTE — PN
Subjective


Date of Service: 05/01/18


Interval History: 








CRT bumped. IVF started. required cecil with PT. Needs Rehab. Says feels worse 

overall but denied pain. 











Objective


Active Medications: 








Dextrose (D50w Syringe 50 Ml*)  12.5 gm IV PUSH .FOR FS < 60 - SS PRN


   PRN Reason: FS < 60


Docusate Sodium (Colace Cap*)  100 mg PO DAILY [facility]


   Last Admin: 05/01/18 08:52 Dose:  100 mg


Enoxaparin Sodium (Lovenox(*))  30 mg SUBCUT Q24H [facility]


   Last Admin: 05/01/18 21:48 Dose:  30 mg


Sodium Chloride (Ns 0.9% 1000 Ml*)  1,000 mls @ 75 mls/hr IV PER RATE [facility]


   Stop: 05/02/18 03:14


   Last Admin: 05/01/18 21:33 Dose:  75 mls/hr


Insulin Human Lispro (Humalog*)  0 units SUBCUT ACHS [facility]


   PRN Reason: Protocol


   Last Admin: 05/01/18 21:30 Dose:  4 unit


Metoprolol Tartrate (Lopressor Tab*)  25 mg PO BID [facility]


   Last Admin: 05/01/18 19:38 Dose:  25 mg


Morphine Sulfate (Morphine Vial*)  2 mg IV Q3H PRN


   PRN Reason: PAIN - MILD


   Last Admin: 05/01/18 06:13 Dose:  2 mg


Omeprazole (Prilosec Cap*)  40 mg PO DAILY [facility]


   Last Admin: 05/01/18 08:54 Dose:  40 mg


Ondansetron HCl (Zofran Inj*)  4 mg IV Q4H PRN


   PRN Reason: NAUSEA


   Last Admin: 04/29/18 18:05 Dose:  4 mg


Oxycodone/Acetaminophen (Percocet 5/325 Tab*)  1 tab PO Q4H PRN


   PRN Reason: PAIN


   Last Admin: 05/01/18 21:29 Dose:  1 tab


Polyethylene Glycol/Electrolytes (Miralax*)  17 gm PO DAILY PRN


   PRN Reason: CONSTIPATION


   Last Admin: 05/01/18 08:48 Dose:  17 gm


Senna (Senokot Tab*)  1 tab PO DAILY [facility]


   Last Admin: 05/01/18 08:52 Dose:  1 tab








 Vital Signs - 8 hr











  05/01/18 05/01/18 05/01/18





  15:19 15:26 17:37


 


Temperature 97.5 F  


 


Pulse Rate 66  


 


Respiratory 18 18 18





Rate   


 


Blood Pressure 101/37  





(mmHg)   


 


O2 Sat by Pulse 99  





Oximetry   














  05/01/18 05/01/18 05/01/18





  19:24 19:40 19:41


 


Temperature 97.9 F  


 


Pulse Rate 73  


 


Respiratory 18 18 18





Rate   


 


Blood Pressure 100/42  





(mmHg)   


 


O2 Sat by Pulse 99  





Oximetry   














  05/01/18 05/01/18





  20:26 21:29


 


Temperature  


 


Pulse Rate  


 


Respiratory  16





Rate  


 


Blood Pressure  





(mmHg)  


 


O2 Sat by Pulse 99 





Oximetry  











Oxygen Devices in Use Now: Nasal Cannula


Appearance: Well appearing, alert. Notably less distressed.


Eyes: No Scleral Icterus, PERRLA


Ears/Nose/Mouth/Throat: NL Teeth, Lips, Gums, Mucous Membranes Moist


Neck: NL Appearance and Movements; NL JVP, Trachea Midline


Respiratory: Symmetrical Chest Expansion and Respiratory Effort, Clear to 

Auscultation


Cardiovascular: NL Sounds; No Murmurs; No JVD, RRR


Extremities: No Edema, No Clubbing, Cyanosis, - - left hip with gauze w/o 

strikethru. no erthyema or induration. 


Skin: No Rash or Ulcers


Neurological: Alert and Oriented x 3, NL Sensation


Nutrition: Taking PO's


Result Diagrams: 


 05/01/18 04:50





 05/01/18 04:50


Additional Lab and Data: 


 





 


 Laboratory Results - last 24 hr











  04/29/18 04/30/18 05/01/18





  21:56 20:50 04:50


 


WBC    8.5


 


RBC    4.60


 


Hgb    8.5 L


 


Hct    27 L


 


MCV    59 L


 


MCH    19 L


 


MCHC    31


 


RDW    17 H


 


Plt Count    148 L


 


MPV    8.5


 


Neut % (Auto)    86.2 H


 


Lymph % (Auto)    6.2 L


 


Mono % (Auto)    7.5 H


 


Eos % (Auto)    0


 


Baso % (Auto)    0.1


 


Absolute Neuts (auto)    7.3


 


Absolute Lymphs (auto)    0.5 L


 


Absolute Monos (auto)    0.6


 


Absolute Eos (auto)    0


 


Absolute Basos (auto)    0


 


Absolute Nucleated RBC    0


 


Nucleated RBC %    0.1


 


Sodium   


 


Potassium   


 


Chloride   


 


Carbon Dioxide   


 


Anion Gap   


 


BUN   


 


Creatinine   


 


Est GFR ( Amer)   


 


Est GFR (Non-Af Amer)   


 


BUN/Creatinine Ratio   


 


Glucose   


 


POC Glucose (mg/dL)  162 H  94 


 


Calcium   














  05/01/18 05/01/18 05/01/18





  04:50 07:44 11:30


 


WBC   


 


RBC   


 


Hgb   


 


Hct   


 


MCV   


 


MCH   


 


MCHC   


 


RDW   


 


Plt Count   


 


MPV   


 


Neut % (Auto)   


 


Lymph % (Auto)   


 


Mono % (Auto)   


 


Eos % (Auto)   


 


Baso % (Auto)   


 


Absolute Neuts (auto)   


 


Absolute Lymphs (auto)   


 


Absolute Monos (auto)   


 


Absolute Eos (auto)   


 


Absolute Basos (auto)   


 


Absolute Nucleated RBC   


 


Nucleated RBC %   


 


Sodium  134 L  


 


Potassium  4.5  


 


Chloride  102  


 


Carbon Dioxide  27  


 


Anion Gap  5  


 


BUN  32 H  


 


Creatinine  1.10 H  


 


Est GFR ( Amer)  61.3  


 


Est GFR (Non-Af Amer)  47.7  


 


BUN/Creatinine Ratio  29.1 H  


 


Glucose  294 H  


 


POC Glucose (mg/dL)   281 H  276 H


 


Calcium  8.3 L  














  05/01/18





  16:58


 


WBC 


 


RBC 


 


Hgb 


 


Hct 


 


MCV 


 


MCH 


 


MCHC 


 


RDW 


 


Plt Count 


 


MPV 


 


Neut % (Auto) 


 


Lymph % (Auto) 


 


Mono % (Auto) 


 


Eos % (Auto) 


 


Baso % (Auto) 


 


Absolute Neuts (auto) 


 


Absolute Lymphs (auto) 


 


Absolute Monos (auto) 


 


Absolute Eos (auto) 


 


Absolute Basos (auto) 


 


Absolute Nucleated RBC 


 


Nucleated RBC % 


 


Sodium 


 


Potassium 


 


Chloride 


 


Carbon Dioxide 


 


Anion Gap 


 


BUN 


 


Creatinine 


 


Est GFR ( Amer) 


 


Est GFR (Non-Af Amer) 


 


BUN/Creatinine Ratio 


 


Glucose 


 


POC Glucose (mg/dL)  171 H


 


Calcium 























Assess/Plan/Problems-Billing


Assessment: 





83 yo female PMH severe dementia, HTN, NIDDM, p/w left femur fracture s/p ORIF. 

diastolic dysfunction. VERNA. needing rehab. 











- Patient Problems


(1) Femur fracture, left


Current Visit: Yes   Status: Acute   Code(s): S72.92XA - UNSP FRACTURE OF LEFT 

FEMUR, INIT ENCNTR FOR CLOSED FRACTURE   SNOMED Code(s): 71290218


   Comment: appreciate ortho recs. 


POD #1 femoral nail


lovenox dvt ppx. 


PT


likely to SNF   





(2) Diabetes mellitus


Current Visit: Yes   Status: Acute   Code(s): E11.9 - TYPE 2 DIABETES MELLITUS 

WITHOUT COMPLICATIONS   SNOMED Code(s): 52549735


   Comment: A1C 7.5


POC qachs


holding home pioglitizone


SSI    





(3) HTN (hypertension)


Current Visit: Yes   Status: Acute   Code(s): I10 - ESSENTIAL (PRIMARY) 

HYPERTENSION   SNOMED Code(s): 44884585


   Comment: -110s. 


stop amlodipine 5mg, stop losartan 50mg. 


continue metoprolol 25mg BID   





(4) Severe dementia


Current Visit: Yes   Status: Acute   Code(s): F03.90 - UNSPECIFIED DEMENTIA 

WITHOUT BEHAVIORAL DISTURBANCE   SNOMED Code(s): 60098599


   Comment: has home aides 7 days a week.    





(5) Diastolic dysfunction


Current Visit: Yes   Status: Acute   Code(s): I51.9 - HEART DISEASE, 

UNSPECIFIED   SNOMED Code(s): 3218973


   Comment: not in acute exacerbation. no Hx of CHF.    


Status and Disposition: 





medicine inpatient. needs rehab placement.

## 2018-05-01 NOTE — PN
Progress Note





- Progress Note


Date of Service: 05/01/18


SOAP: 


Subjective:


[]Patient seen at bedside. She reports no pain of her LLE at this time though s 

he has been very vocal about pain, yelling out throughout the day. No CP, SOB, 

dizziness, nausea. 





Objective:


[]


 Vital Signs











Temp  97.9 F   05/01/18 19:24


 


Pulse  73   05/01/18 19:24


 


Resp  18   05/01/18 19:41


 


BP  100/42   05/01/18 19:24


 


Pulse Ox  99   05/01/18 20:26








 Intake & Output











 05/01/18 05/01/18 05/02/18





 06:59 18:59 06:59


 


Intake Total 770 585 


 


Output Total 230 150 


 


Balance 540 435 


 


Weight 155 lb  


 


Intake:   


 


  IV Fluids 90  


 


    ABX - CEFAZOLIN 90  


 


  Oral 680 585 


 


Output:   


 


  [place] 230 150 


 


Other:   


 


  # Bowel Movements 0  0








 Laboratory Last Values











WBC  8.5 10^3/ul (3.5-10.8)   05/01/18  04:50    


 


RBC  4.60 10^6/ul (4.0-5.4)   05/01/18  04:50    


 


Hgb  8.5 g/dl (12.0-16.0)  L  05/01/18  04:50    


 


Hct  27 % (35-47)  L  05/01/18  04:50    


 


MCV  59 fL (80-97)  L  05/01/18  04:50    


 


MCH  19 pg (27-31)  L  05/01/18  04:50    


 


MCHC  31 g/dl (31-36)   05/01/18  04:50    


 


RDW  17 % (10.5-15)  H  05/01/18  04:50    


 


Plt Count  148 10^3/ul (150-450)  L  05/01/18  04:50    


 


MPV  8.5 um3 (7.4-10.4)   05/01/18  04:50    


 


Neut % (Auto)  86.2 % (38-83)  H  05/01/18  04:50    


 


Lymph % (Auto)  6.2 % (25-47)  L  05/01/18  04:50    


 


Mono % (Auto)  7.5 % (0-7)  H  05/01/18  04:50    


 


Eos % (Auto)  0 % (0-6)   05/01/18  04:50    


 


Baso % (Auto)  0.1 % (0-2)   05/01/18  04:50    


 


Absolute Neuts (auto)  7.3 10^3/ul (1.5-7.7)   05/01/18  04:50    


 


Absolute Lymphs (auto)  0.5 10^3/ul (1.0-4.8)  L  05/01/18  04:50    


 


Absolute Monos (auto)  0.6 10^3/ul (0-0.8)   05/01/18  04:50    


 


Absolute Eos (auto)  0 10^3/ul (0-0.6)   05/01/18  04:50    


 


Absolute Basos (auto)  0 10^3/ul (0-0.2)   05/01/18  04:50    


 


Absolute Nucleated RBC  0 10^3/ul  05/01/18  04:50    


 


Nucleated RBC %  0.1   05/01/18  04:50    


 


Giant Platelets  Present   04/29/18  10:07    


 


Hypochromasia  2+   04/29/18  10:07    


 


Microcytosis  2+   04/29/18  10:07    


 


Target Cells  1+   04/29/18  10:07    


 


Hem Pathologist Commnt     04/29/18  10:07    


 


APTT  31.3 seconds (26.0-36.3)   04/29/18  10:07    


 


Sodium  134 mmol/L (139-145)  L  05/01/18  04:50    


 


Potassium  4.5 mmol/L (3.5-5.0)   05/01/18  04:50    


 


Chloride  102 mmol/L (101-111)   05/01/18  04:50    


 


Carbon Dioxide  27 mmol/L (22-32)   05/01/18  04:50    


 


Anion Gap  5 mmol/L (2-11)   05/01/18  04:50    


 


BUN  32 mg/dL (6-24)  H  05/01/18  04:50    


 


Creatinine  1.10 mg/dL (0.51-0.95)  H  05/01/18  04:50    


 


Est GFR ( Amer)  61.3  (>60)   05/01/18  04:50    


 


Est GFR (Non-Af Amer)  47.7  (>60)   05/01/18  04:50    


 


BUN/Creatinine Ratio  29.1  (8-20)  H  05/01/18  04:50    


 


Glucose  294 mg/dL ()  H  05/01/18  04:50    


 


POC Glucose (mg/dL)  171 mg/dL ()  H  05/01/18  16:58    


 


Hemoglobin A1c  7.5 % (4.0-5.6)  H  04/29/18  10:07    


 


Calcium  8.3 mg/dL (8.6-10.3)  L  05/01/18  04:50    


 


Magnesium  1.8 mg/dL (1.9-2.7)  L  04/29/18  10:07    


 


Iron  29 ug/dL ()  L  04/30/18  05:08    


 


TIBC  279 mcg/dL (250-450)   04/30/18  05:08    


 


% Saturation  10 % (15-55)  L  04/30/18  05:08    


 


Unsat Iron Binding  250 ug/dL  04/30/18  05:08    


 


Transferrin  199 mg/dL (203-362)  L  04/30/18  05:08    


 


Ferritin  147.5 ng/mL ()   04/30/18  05:08    


 


Total Bilirubin  0.80 mg/dL (0.2-1.0)   04/29/18  10:07    


 


AST  14 U/L (13-39)   04/29/18  10:07    


 


ALT  9 U/L (7-52)   04/29/18  10:07    


 


Alkaline Phosphatase  103 U/L ()   04/29/18  10:07    


 


Troponin I  0.01 ng/mL (<0.04)   04/29/18  10:07    


 


B-Natriuretic Peptide  140 pg/mL (-100) H  04/29/18  10:07    


 


Total Protein  6.7 g/dL (6.4-8.9)   04/29/18  10:07    


 


Albumin  3.2 g/dL (3.2-5.2)   04/29/18  10:07    


 


Globulin  3.5 g/dL (2-4)   04/29/18  10:07    


 


Albumin/Globulin Ratio  0.9  (1-3)  L  04/29/18  10:07    











General: Well appearing, NAD


LLE: Dressings CDI without surrounding erythema. Thigh is soft and nontender. DF

/PF intact. DP 2+


BL LE: Calves supple and nontender without erythema, edema or palpable cords








Assessment:


[]POD 1 sp TFN nailing left hip, Dr Fred








Plan:


[]TTWB LLE 


Dry sterile dressing change daily


Lovenox 30 mg sq q 24 hr. 


Appreciate hospitalist comanagement

## 2018-05-02 LAB
BASOPHILS # BLD AUTO: 0 10^3/UL (ref 0–0.2)
EOSINOPHIL # BLD AUTO: 0.3 10^3/UL (ref 0–0.6)
HCT VFR BLD AUTO: 22 % (ref 35–47)
HGB BLD-MCNC: 7.2 G/DL (ref 12–16)
INR PPP/BLD: 0.78 (ref 0.77–1.02)
LYMPHOCYTES # BLD AUTO: 1.3 10^3/UL (ref 1–4.8)
MCH RBC QN AUTO: 19 PG (ref 27–31)
MCHC RBC AUTO-ENTMCNC: 33 G/DL (ref 31–36)
MCV RBC AUTO: 58 FL (ref 80–97)
MONOCYTES # BLD AUTO: 0.9 10^3/UL (ref 0–0.8)
NEUTROPHILS # BLD AUTO: 5.5 10^3/UL (ref 1.5–7.7)
NRBC # BLD AUTO: 0 10^3/UL
NRBC BLD QL AUTO: 0
PLATELET # BLD AUTO: 142 10^3/UL (ref 150–450)
RBC # BLD AUTO: 3.79 10^6/UL (ref 4–5.4)
WBC # BLD AUTO: 8 10^3/UL (ref 3.5–10.8)

## 2018-05-02 PROCEDURE — 30233N1 TRANSFUSION OF NONAUTOLOGOUS RED BLOOD CELLS INTO PERIPHERAL VEIN, PERCUTANEOUS APPROACH: ICD-10-PCS | Performed by: HOSPITALIST

## 2018-05-02 RX ADMIN — METOPROLOL TARTRATE SCH MG: 25 TABLET, FILM COATED ORAL at 09:04

## 2018-05-02 RX ADMIN — INSULIN LISPRO SCH UNIT: 100 INJECTION, SOLUTION INTRAVENOUS; SUBCUTANEOUS at 21:46

## 2018-05-02 RX ADMIN — OXYCODONE HYDROCHLORIDE AND ACETAMINOPHEN PRN TAB: 5; 325 TABLET ORAL at 04:57

## 2018-05-02 RX ADMIN — OMEPRAZOLE SCH MG: 20 CAPSULE, DELAYED RELEASE ORAL at 09:04

## 2018-05-02 RX ADMIN — OXYCODONE HYDROCHLORIDE AND ACETAMINOPHEN PRN TAB: 5; 325 TABLET ORAL at 17:09

## 2018-05-02 RX ADMIN — DOCUSATE SODIUM SCH MG: 100 CAPSULE, LIQUID FILLED ORAL at 09:04

## 2018-05-02 RX ADMIN — SENNOSIDES SCH TAB: 8.6 TABLET, FILM COATED ORAL at 09:04

## 2018-05-02 RX ADMIN — OXYCODONE HYDROCHLORIDE AND ACETAMINOPHEN PRN TAB: 5; 325 TABLET ORAL at 21:45

## 2018-05-02 RX ADMIN — OXYCODONE HYDROCHLORIDE AND ACETAMINOPHEN PRN TAB: 5; 325 TABLET ORAL at 10:04

## 2018-05-02 RX ADMIN — ENOXAPARIN SODIUM SCH MG: 30 INJECTION SUBCUTANEOUS at 21:46

## 2018-05-02 RX ADMIN — METOPROLOL TARTRATE SCH MG: 25 TABLET, FILM COATED ORAL at 21:47

## 2018-05-02 RX ADMIN — INSULIN LISPRO SCH UNIT: 100 INJECTION, SOLUTION INTRAVENOUS; SUBCUTANEOUS at 13:40

## 2018-05-02 RX ADMIN — INSULIN LISPRO SCH UNIT: 100 INJECTION, SOLUTION INTRAVENOUS; SUBCUTANEOUS at 09:12

## 2018-05-02 RX ADMIN — INSULIN LISPRO SCH UNIT: 100 INJECTION, SOLUTION INTRAVENOUS; SUBCUTANEOUS at 17:44

## 2018-05-02 NOTE — PN
Subjective


Date of Service: 05/02/18


Interval History: 








Hgb to 7.2. Consented for blood, 1 u pRBC by Ortho. 


Pt not oriented. Forgetful. needing cecil with PT


CRT 0.9 from 1.1


BP better. 

















Objective


Active Medications: 








Dextrose (D50w Syringe 50 Ml*)  12.5 gm IV PUSH .FOR FS < 60 - SS PRN


   PRN Reason: FS < 60


Docusate Sodium (Colace Cap*)  100 mg PO DAILY [facility]


   Last Admin: 05/02/18 09:04 Dose:  100 mg


Enoxaparin Sodium (Lovenox(*))  30 mg SUBCUT Q24H [facility]


   Last Admin: 05/01/18 21:48 Dose:  30 mg


Insulin Human Lispro (Humalog*)  0 units SUBCUT ACHS [facility]


   PRN Reason: Protocol


   Last Admin: 05/02/18 17:44 Dose:  2 unit


Metoprolol Tartrate (Lopressor Tab*)  25 mg PO BID [facility]


   Last Admin: 05/02/18 09:04 Dose:  25 mg


Morphine Sulfate (Morphine Vial*)  2 mg IV Q3H PRN


   PRN Reason: PAIN - MILD


   Last Admin: 05/01/18 06:13 Dose:  2 mg


Omeprazole (Prilosec Cap*)  40 mg PO DAILY [facility]


   Last Admin: 05/02/18 09:04 Dose:  40 mg


Ondansetron HCl (Zofran Inj*)  4 mg IV Q4H PRN


   PRN Reason: NAUSEA


   Last Admin: 04/29/18 18:05 Dose:  4 mg


Oxycodone/Acetaminophen (Percocet 5/325 Tab*)  1 tab PO Q4H PRN


   PRN Reason: PAIN


   Last Admin: 05/02/18 17:09 Dose:  1 tab


Polyethylene Glycol/Electrolytes (Miralax*)  17 gm PO DAILY PRN


   PRN Reason: CONSTIPATION


   Last Admin: 05/01/18 08:48 Dose:  17 gm


Senna (Senokot Tab*)  1 tab PO DAILY [facility]


   Last Admin: 05/02/18 09:04 Dose:  1 tab








 Vital Signs - 8 hr











  05/02/18 05/02/18 05/02/18





  16:19 17:09 19:20


 


Temperature 98.2 F  97.8 F


 


Pulse Rate 72  78


 


Respiratory 16 16 18





Rate   


 


Blood Pressure 112/44  121/47





(mmHg)   


 


O2 Sat by Pulse 98  98





Oximetry   














  05/02/18 05/02/18 05/02/18





  19:28 19:35 19:36


 


Temperature 97.8 F  


 


Pulse Rate 78  


 


Respiratory 16 18 18





Rate   


 


Blood Pressure 119/48  





(mmHg)   


 


O2 Sat by Pulse 96  





Oximetry   











Oxygen Devices in Use Now: Nasal Cannula


Appearance: NAD


Eyes: No Scleral Icterus


Neck: NL Appearance and Movements; NL JVP, Trachea Midline


Respiratory: Symmetrical Chest Expansion and Respiratory Effort


Neurological: - - oriented to person only 


Nutrition: Taking PO's


Result Diagrams: 


 05/02/18 04:51





 05/02/18 04:51


Additional Lab and Data: 


 





 


 


 Laboratory Results - last 24 hr











  05/01/18 05/02/18 05/02/18





  21:21 04:51 04:51


 


WBC   8.0 


 


RBC   3.79 L 


 


Hgb   7.2 L 


 


Hct   22 L 


 


MCV   58 L 


 


MCH   19 L 


 


MCHC   33 


 


RDW   17 H 


 


Plt Count   142 L 


 


MPV   8.6 


 


Neut % (Auto)   68.9 


 


Lymph % (Auto)   16.8 L 


 


Mono % (Auto)   10.7 H 


 


Eos % (Auto)   3.4 


 


Baso % (Auto)   0.2 


 


Absolute Neuts (auto)   5.5 


 


Absolute Lymphs (auto)   1.3 


 


Absolute Monos (auto)   0.9 H 


 


Absolute Eos (auto)   0.3 


 


Absolute Basos (auto)   0 


 


Absolute Nucleated RBC   0 


 


Nucleated RBC %   0 


 


Large Platelets   Present 


 


Hypochromasia   1+ 


 


Microcytosis   2+ 


 


Elliptocytes   1+ 


 


INR (Anticoag Therapy)   


 


APTT   


 


Sodium    135 L


 


Potassium    3.9


 


Chloride    102


 


Carbon Dioxide    27


 


Anion Gap    6


 


BUN    36 H


 


Creatinine    0.93


 


Est GFR ( Amer)    74.4


 


Est GFR (Non-Af Amer)    57.9


 


BUN/Creatinine Ratio    38.7 H


 


Glucose    174 H


 


POC Glucose (mg/dL)  243 H  


 


Calcium    8.0 L


 


Blood Type   


 


Antibody Screen   


 


Crossmatch   














  05/02/18 05/02/18 05/02/18





  04:51 04:51 07:26


 


WBC   


 


RBC   


 


Hgb   


 


Hct   


 


MCV   


 


MCH   


 


MCHC   


 


RDW   


 


Plt Count   


 


MPV   


 


Neut % (Auto)   


 


Lymph % (Auto)   


 


Mono % (Auto)   


 


Eos % (Auto)   


 


Baso % (Auto)   


 


Absolute Neuts (auto)   


 


Absolute Lymphs (auto)   


 


Absolute Monos (auto)   


 


Absolute Eos (auto)   


 


Absolute Basos (auto)   


 


Absolute Nucleated RBC   


 


Nucleated RBC %   


 


Large Platelets   


 


Hypochromasia   


 


Microcytosis   


 


Elliptocytes   


 


INR (Anticoag Therapy)  0.78  


 


APTT  29.2  


 


Sodium   


 


Potassium   


 


Chloride   


 


Carbon Dioxide   


 


Anion Gap   


 


BUN   


 


Creatinine   


 


Est GFR ( Amer)   


 


Est GFR (Non-Af Amer)   


 


BUN/Creatinine Ratio   


 


Glucose   


 


POC Glucose (mg/dL)    167 H


 


Calcium   


 


Blood Type   O Positive 


 


Antibody Screen   Negative 


 


Crossmatch   See Detail 














  05/02/18 05/02/18 05/02/18





  11:47 16:46 21:39


 


WBC   


 


RBC   


 


Hgb   


 


Hct   


 


MCV   


 


MCH   


 


MCHC   


 


RDW   


 


Plt Count   


 


MPV   


 


Neut % (Auto)   


 


Lymph % (Auto)   


 


Mono % (Auto)   


 


Eos % (Auto)   


 


Baso % (Auto)   


 


Absolute Neuts (auto)   


 


Absolute Lymphs (auto)   


 


Absolute Monos (auto)   


 


Absolute Eos (auto)   


 


Absolute Basos (auto)   


 


Absolute Nucleated RBC   


 


Nucleated RBC %   


 


Large Platelets   


 


Hypochromasia   


 


Microcytosis   


 


Elliptocytes   


 


INR (Anticoag Therapy)   


 


APTT   


 


Sodium   


 


Potassium   


 


Chloride   


 


Carbon Dioxide   


 


Anion Gap   


 


BUN   


 


Creatinine   


 


Est GFR ( Amer)   


 


Est GFR (Non-Af Amer)   


 


BUN/Creatinine Ratio   


 


Glucose   


 


POC Glucose (mg/dL)  181 H  179 H  175 H


 


Calcium   


 


Blood Type   


 


Antibody Screen   


 


Crossmatch   























Assess/Plan/Problems-Billing


Assessment: 





83 yo female PMH severe dementia, HTN, NIDDM, p/w left femur fracture s/p ORIF. 

diastolic dysfunction. VERNA (improved). needing rehab. Anemic, getting 1u pRBC. 











- Patient Problems


(1) Femur fracture, left


Current Visit: Yes   Status: Acute   Code(s): S72.92XA - UNSP FRACTURE OF LEFT 

FEMUR, INIT ENCNTR FOR CLOSED FRACTURE   SNOMED Code(s): 91329463


   Comment: appreciate ortho recs. 


POD #2 femoral nail


lovenox dvt ppx. 


PT


likely to SNF


postoperative anemia. getting 1 u prbc given diastolic dysfunction.    





(2) Diabetes mellitus


Current Visit: Yes   Status: Acute   Code(s): E11.9 - TYPE 2 DIABETES MELLITUS 

WITHOUT COMPLICATIONS   SNOMED Code(s): 70299294


   Comment: A1C 7.5


POC qachs


holding home pioglitizone


SSI    





(3) HTN (hypertension)


Current Visit: Yes   Status: Acute   Code(s): I10 - ESSENTIAL (PRIMARY) 

HYPERTENSION   SNOMED Code(s): 45306859


   Comment: -120s. 


stop amlodipine 5mg, stop losartan 50mg. 


continue metoprolol 25mg BID   





(4) Severe dementia


Current Visit: Yes   Status: Acute   Code(s): F03.90 - UNSPECIFIED DEMENTIA 

WITHOUT BEHAVIORAL DISTURBANCE   SNOMED Code(s): 87595464


   Comment: has home aides 7 days a week.    





(5) Diastolic dysfunction


Current Visit: Yes   Status: Acute   Code(s): I51.9 - HEART DISEASE, 

UNSPECIFIED   SNOMED Code(s): 8209425


   Comment: not in acute exacerbation. no Hx of CHF.    


Status and Disposition: 





medicine inpatient. needs rehab placement.

## 2018-05-02 NOTE — PN
Progress Note





- Progress Note


Date of Service: 05/02/18


SOAP: 


Subjective:


[]Patient seen at bedside. She feels well with no current hip pain. She is very 

concerned about pain to change her dressing, or with any contact with her left 

leg. Denies chest pain, shortness of breath, dizziness. Refuses SCDs. 








Objective:


[]


 Laboratory Last Values











WBC  8.0 10^3/ul (3.5-10.8)   05/02/18  04:51    


 


RBC  3.79 10^6/ul (4.0-5.4)  L  05/02/18  04:51    


 


Hgb  7.2 g/dl (12.0-16.0)  L  05/02/18  04:51    


 


Hct  22 % (35-47)  L  05/02/18  04:51    


 


MCV  58 fL (80-97)  L  05/02/18  04:51    


 


MCH  19 pg (27-31)  L  05/02/18  04:51    


 


MCHC  33 g/dl (31-36)   05/02/18  04:51    


 


RDW  17 % (10.5-15)  H  05/02/18  04:51    


 


Plt Count  142 10^3/ul (150-450)  L  05/02/18  04:51    


 


MPV  8.6 um3 (7.4-10.4)   05/02/18  04:51    


 


Neut % (Auto)  68.9 % (38-83)   05/02/18  04:51    


 


Lymph % (Auto)  16.8 % (25-47)  L  05/02/18  04:51    


 


Mono % (Auto)  10.7 % (0-7)  H  05/02/18  04:51    


 


Eos % (Auto)  3.4 % (0-6)   05/02/18  04:51    


 


Baso % (Auto)  0.2 % (0-2)   05/02/18  04:51    


 


Absolute Neuts (auto)  5.5 10^3/ul (1.5-7.7)   05/02/18  04:51    


 


Absolute Lymphs (auto)  1.3 10^3/ul (1.0-4.8)   05/02/18  04:51    


 


Absolute Monos (auto)  0.9 10^3/ul (0-0.8)  H  05/02/18  04:51    


 


Absolute Eos (auto)  0.3 10^3/ul (0-0.6)   05/02/18  04:51    


 


Absolute Basos (auto)  0 10^3/ul (0-0.2)   05/02/18  04:51    


 


Absolute Nucleated RBC  0 10^3/ul  05/02/18  04:51    


 


Nucleated RBC %  0   05/02/18  04:51    


 


Large Platelets  Present   05/02/18  04:51    


 


Giant Platelets  Present   04/29/18  10:07    


 


Hypochromasia  1+   05/02/18  04:51    


 


Microcytosis  2+   05/02/18  04:51    


 


Target Cells  1+   04/29/18  10:07    


 


Elliptocytes  1+   05/02/18  04:51    


 


Hem Pathologist Commnt     04/29/18  10:07    


 


INR (Anticoag Therapy)  0.78  (0.77-1.02)   05/02/18  04:51    


 


APTT  29.2 seconds (26.0-36.3)   05/02/18  04:51    


 


Sodium  135 mmol/L (139-145)  L  05/02/18  04:51    


 


Potassium  3.9 mmol/L (3.5-5.0)   05/02/18  04:51    


 


Chloride  102 mmol/L (101-111)   05/02/18  04:51    


 


Carbon Dioxide  27 mmol/L (22-32)   05/02/18  04:51    


 


Anion Gap  6 mmol/L (2-11)   05/02/18  04:51    


 


BUN  36 mg/dL (6-24)  H  05/02/18  04:51    


 


Creatinine  0.93 mg/dL (0.51-0.95)   05/02/18  04:51    


 


Est GFR ( Amer)  74.4  (>60)   05/02/18  04:51    


 


Est GFR (Non-Af Amer)  57.9  (>60)   05/02/18  04:51    


 


BUN/Creatinine Ratio  38.7  (8-20)  H  05/02/18  04:51    


 


Glucose  174 mg/dL ()  H  05/02/18  04:51    


 


POC Glucose (mg/dL)  243 mg/dL ()  H  05/01/18  21:21    


 


Hemoglobin A1c  7.5 % (4.0-5.6)  H  04/29/18  10:07    


 


Calcium  8.0 mg/dL (8.6-10.3)  L  05/02/18  04:51    


 


Magnesium  1.8 mg/dL (1.9-2.7)  L  04/29/18  10:07    


 


Iron  29 ug/dL ()  L  04/30/18  05:08    


 


TIBC  279 mcg/dL (250-450)   04/30/18  05:08    


 


% Saturation  10 % (15-55)  L  04/30/18  05:08    


 


Unsat Iron Binding  250 ug/dL  04/30/18  05:08    


 


Transferrin  199 mg/dL (203-362)  L  04/30/18  05:08    


 


Ferritin  147.5 ng/mL ()   04/30/18  05:08    


 


Total Bilirubin  0.80 mg/dL (0.2-1.0)   04/29/18  10:07    


 


AST  14 U/L (13-39)   04/29/18  10:07    


 


ALT  9 U/L (7-52)   04/29/18  10:07    


 


Alkaline Phosphatase  103 U/L ()   04/29/18  10:07    


 


Troponin I  0.01 ng/mL (<0.04)   04/29/18  10:07    


 


B-Natriuretic Peptide  140 pg/mL (-100) H  04/29/18  10:07    


 


Total Protein  6.7 g/dL (6.4-8.9)   04/29/18  10:07    


 


Albumin  3.2 g/dL (3.2-5.2)   04/29/18  10:07    


 


Globulin  3.5 g/dL (2-4)   04/29/18  10:07    


 


Albumin/Globulin Ratio  0.9  (1-3)  L  04/29/18  10:07    








 Vital Signs











Temp  97.6 F   05/02/18 07:22


 


Pulse  64   05/02/18 07:22


 


Resp  14   05/02/18 10:04


 


BP  107/42   05/02/18 07:22


 


Pulse Ox  100   05/02/18 07:22








 Intake & Output











 05/01/18 05/02/18 05/02/18





 18:59 06:59 18:59


 


Intake Total 585 2083 


 


Output Total 150 400 


 


Balance 435 1683 


 


Intake:   


 


  IV Fluids  1546 


 


    NS (0.9%)  1546 


 


  IVPB  57 


 


    ABX - CEFAZOLIN  57 


 


  Oral 585 480 


 


Output:   


 


  [place] 150 400 


 


Other:   


 


  # Bowel Movements  0 











General: Well appearing, NAD


LLE: Dressings changed, incisions CDI without surrounding erythema. Thigh is 

soft and nontender. DF/PF intact. DP 2+


BL LE: Calves supple and nontender without erythema, edema or palpable cords








Assessment:


[]POD 2 sp TFN nailing left hip, Dr Ibarra





Plan:


[]TTWB LLE 


Dry sterile dressing change daily


Lovenox 30 mg sq q 24 hr. 


Needs to wear SCDs as much as able 


1 unit PRBC ordered, discussed with medicine, in agreement 


Appreciate hospitalist ana

## 2018-05-03 VITALS — SYSTOLIC BLOOD PRESSURE: 155 MMHG | DIASTOLIC BLOOD PRESSURE: 71 MMHG

## 2018-05-03 LAB
BASOPHILS # BLD AUTO: 0.1 10^3/UL (ref 0–0.2)
EOSINOPHIL # BLD AUTO: 0.4 10^3/UL (ref 0–0.6)
HCT VFR BLD AUTO: 27 % (ref 35–47)
HGB BLD-MCNC: 9.1 G/DL (ref 12–16)
LYMPHOCYTES # BLD AUTO: 2.1 10^3/UL (ref 1–4.8)
MCH RBC QN AUTO: 21 PG (ref 27–31)
MCHC RBC AUTO-ENTMCNC: 33 G/DL (ref 31–36)
MCV RBC AUTO: 61 FL (ref 80–97)
MONOCYTES # BLD AUTO: 0.8 10^3/UL (ref 0–0.8)
NEUTROPHILS # BLD AUTO: 4.5 10^3/UL (ref 1.5–7.7)
NRBC # BLD AUTO: 0 10^3/UL
NRBC BLD QL AUTO: 0.1
PLATELET # BLD AUTO: 145 10^3/UL (ref 150–450)
RBC # BLD AUTO: 4.43 10^6/UL (ref 4–5.4)
WBC # BLD AUTO: 7.9 10^3/UL (ref 3.5–10.8)

## 2018-05-03 RX ADMIN — INSULIN LISPRO SCH: 100 INJECTION, SOLUTION INTRAVENOUS; SUBCUTANEOUS at 12:49

## 2018-05-03 RX ADMIN — OXYCODONE HYDROCHLORIDE AND ACETAMINOPHEN PRN TAB: 5; 325 TABLET ORAL at 14:12

## 2018-05-03 RX ADMIN — OXYCODONE HYDROCHLORIDE AND ACETAMINOPHEN PRN TAB: 5; 325 TABLET ORAL at 06:13

## 2018-05-03 RX ADMIN — POLYETHYLENE GLYCOL 3350 PRN GM: 17 POWDER, FOR SOLUTION ORAL at 09:31

## 2018-05-03 RX ADMIN — OXYCODONE HYDROCHLORIDE AND ACETAMINOPHEN PRN TAB: 5; 325 TABLET ORAL at 10:24

## 2018-05-03 RX ADMIN — SENNOSIDES SCH TAB: 8.6 TABLET, FILM COATED ORAL at 09:31

## 2018-05-03 RX ADMIN — METOPROLOL TARTRATE SCH MG: 25 TABLET, FILM COATED ORAL at 09:31

## 2018-05-03 RX ADMIN — OMEPRAZOLE SCH MG: 20 CAPSULE, DELAYED RELEASE ORAL at 09:31

## 2018-05-03 RX ADMIN — MORPHINE SULFATE PRN MG: 4 INJECTION INTRAVENOUS at 06:22

## 2018-05-03 RX ADMIN — DOCUSATE SODIUM SCH MG: 100 CAPSULE, LIQUID FILLED ORAL at 09:31

## 2018-05-03 RX ADMIN — INSULIN LISPRO SCH UNIT: 100 INJECTION, SOLUTION INTRAVENOUS; SUBCUTANEOUS at 09:31

## 2018-05-03 NOTE — PN
Progress Note





- Progress Note


Date of Service: 05/03/18


SOAP: 


Subjective:


[]Patient seen at bedside. She is in no pain at rest but is in a great amount 

of pain with any movement. Denies CP, SOB, dizziness, nausea








Objective:


[]


 Vital Signs











Temp  98.4 F   05/03/18 07:47


 


Pulse  78   05/03/18 07:47


 


Resp  20   05/03/18 10:24


 


BP  120/44   05/03/18 07:47


 


Pulse Ox  94   05/03/18 07:47








 Intake & Output











 05/02/18 05/03/18 05/03/18





 18:59 06:59 18:59


 


Intake Total 450 600 225


 


Output Total 250 520 


 


Balance 200 80 225


 


Weight  168 lb 12.8 oz 


 


Intake:   


 


  Oral 450 600 225


 


Output:   


 


  [place] 250 520 


 


Other:   


 


  # Bowel Movements  0 








 Laboratory Last Values











WBC  7.9 10^3/ul (3.5-10.8)   05/03/18  04:54    


 


RBC  4.43 10^6/ul (4.0-5.4)   05/03/18  04:54    


 


Hgb  9.1 g/dl (12.0-16.0)  L  05/03/18  04:54    


 


Hct  27 % (35-47)  L  05/03/18  04:54    


 


MCV  61 fL (80-97)  L  05/03/18  04:54    


 


MCH  21 pg (27-31)  L  05/03/18  04:54    


 


MCHC  33 g/dl (31-36)   05/03/18  04:54    


 


RDW  19 % (10.5-15)  H  05/03/18  04:54    


 


Plt Count  145 10^3/ul (150-450)  L  05/03/18  04:54    


 


MPV  8.7 um3 (7.4-10.4)   05/03/18  04:54    


 


Neut % (Auto)  57.3 % (38-83)   05/03/18  04:54    


 


Lymph % (Auto)  26.2 % (25-47)   05/03/18  04:54    


 


Mono % (Auto)  10.5 % (0-7)  H  05/03/18  04:54    


 


Eos % (Auto)  5.2 % (0-6)   05/03/18  04:54    


 


Baso % (Auto)  0.8 % (0-2)   05/03/18  04:54    


 


Absolute Neuts (auto)  4.5 10^3/ul (1.5-7.7)   05/03/18  04:54    


 


Absolute Lymphs (auto)  2.1 10^3/ul (1.0-4.8)   05/03/18  04:54    


 


Absolute Monos (auto)  0.8 10^3/ul (0-0.8)   05/03/18  04:54    


 


Absolute Eos (auto)  0.4 10^3/ul (0-0.6)   05/03/18  04:54    


 


Absolute Basos (auto)  0.1 10^3/ul (0-0.2)   05/03/18  04:54    


 


Absolute Nucleated RBC  0 10^3/ul  05/03/18  04:54    


 


Nucleated RBC %  0.1   05/03/18  04:54    


 


Large Platelets  Present   05/02/18  04:51    


 


Giant Platelets  Present   04/29/18  10:07    


 


Hypochromasia  1+   05/02/18  04:51    


 


Microcytosis  2+   05/02/18  04:51    


 


Target Cells  1+   04/29/18  10:07    


 


Elliptocytes  1+   05/02/18  04:51    


 


Hem Pathologist Commnt     04/29/18  10:07    


 


INR (Anticoag Therapy)  0.78  (0.77-1.02)   05/02/18  04:51    


 


APTT  29.2 seconds (26.0-36.3)   05/02/18  04:51    


 


Sodium  134 mmol/L (139-145)  L  05/03/18  04:54    


 


Potassium  4.2 mmol/L (3.5-5.0)   05/03/18  04:54    


 


Chloride  102 mmol/L (101-111)   05/03/18  04:54    


 


Carbon Dioxide  26 mmol/L (22-32)   05/03/18  04:54    


 


Anion Gap  6 mmol/L (2-11)   05/03/18  04:54    


 


BUN  29 mg/dL (6-24)  H  05/03/18  04:54    


 


Creatinine  0.71 mg/dL (0.51-0.95)   05/03/18  04:54    


 


Est GFR ( Amer)  101.6  (>60)   05/03/18  04:54    


 


Est GFR (Non-Af Amer)  79.0  (>60)   05/03/18  04:54    


 


BUN/Creatinine Ratio  40.8  (8-20)  H  05/03/18  04:54    


 


Glucose  153 mg/dL ()  H  05/03/18  04:54    


 


POC Glucose (mg/dL)  164 mg/dL ()  H  05/03/18  07:28    


 


Hemoglobin A1c  7.5 % (4.0-5.6)  H  04/29/18  10:07    


 


Calcium  8.5 mg/dL (8.6-10.3)  L  05/03/18  04:54    


 


Magnesium  1.8 mg/dL (1.9-2.7)  L  04/29/18  10:07    


 


Iron  29 ug/dL ()  L  04/30/18  05:08    


 


TIBC  279 mcg/dL (250-450)   04/30/18  05:08    


 


% Saturation  10 % (15-55)  L  04/30/18  05:08    


 


Unsat Iron Binding  250 ug/dL  04/30/18  05:08    


 


Transferrin  199 mg/dL (203-362)  L  04/30/18  05:08    


 


Ferritin  147.5 ng/mL ()   04/30/18  05:08    


 


Total Bilirubin  0.80 mg/dL (0.2-1.0)   04/29/18  10:07    


 


AST  14 U/L (13-39)   04/29/18  10:07    


 


ALT  9 U/L (7-52)   04/29/18  10:07    


 


Alkaline Phosphatase  103 U/L ()   04/29/18  10:07    


 


Troponin I  0.01 ng/mL (<0.04)   04/29/18  10:07    


 


B-Natriuretic Peptide  140 pg/mL (-100) H  04/29/18  10:07    


 


Total Protein  6.7 g/dL (6.4-8.9)   04/29/18  10:07    


 


Albumin  3.2 g/dL (3.2-5.2)   04/29/18  10:07    


 


Globulin  3.5 g/dL (2-4)   04/29/18  10:07    


 


Albumin/Globulin Ratio  0.9  (1-3)  L  04/29/18  10:07    


 


Blood Type  O Positive   05/02/18  04:51    


 


Antibody Screen  Negative   05/02/18  04:51    


 


Crossmatch  See Detail   05/02/18  04:51    











General: Well appearing, NAD


LLE: Dressings changed, incisions CDI without surrounding erythema. Thigh is 

soft and nontender. DF/PF intact. DP 2+


BL LE: Calves supple and nontender without erythema, edema or palpable cords








Assessment:


[]POD 3 sp TFN nailing left hip, Dr Ibarra


Acute bloodloss anemia 





Plan:


[]TTWB LLE 


Dry sterile dressing change daily


Lovenox 30 mg sq q 24 hr. 


Needs to wear SCDs as much as able 


Appreciate hospitalist comanagement

## 2018-05-03 NOTE — DS
DATE OF ADMISSION:  04/29/2018.

 

DATE OF DISCHARGE:  05/03/2018.

 

ADMITTING AND ATTENDING PHYSICIAN:  Dr. Charles Tafoya.

 

PRIMARY CARE PHYSICIAN:  Dr. Yimi Atkinson.

 

CONSULTING ORTHOPEDIC SURGEON:  Dr. Ibarra.

 

CHIEF COMPLAINT:  Left hip pain.

 

PRINCIPAL DIAGNOSIS:  Left subtrochanteric femur fracture, status post TFN with 
Dr. Ibarra on 04/30/2018.

 

HISTORY OF PRESENT ILLNESS:  Nilsa Mojica is an 82-year-old female with a past 
medical history of severe dementia, hypertension, noninsulin dependent diabetes 
mellitus who had had a recent fall two days prior to admission.  She had 
imaging done at [facility], though it did not show any fractures at that 
time.  She was discharged to home and on the day of her admission her caregiver 
was swinging her legs across to set her up in bed when the patient immediately 
yelled out pain. The leg had foreshortening.  She presented to [facility] 
[facility] and was found to have a left subtrochanteric femur fracture.  She was 
cleared medically by the Hospitalist service.  She did get an echocardiogram 
which did show some evidence of diastolic dysfunction with a preserved ejection 
fraction of 55 to 60 percent with mild tricuspid regurgitation.  Her 
postoperative course was complicated by some anemia with a hemoglobin down to 
7.2 on hospital day number two for which she received one unit.  Her hemoglobin 
on discharge was 9.1.  Admission hemoglobin was 10.9.  She does have prominent 
microcytosis with MCV's 58 to 61.  She has [place] heritage and should be 
considered for work-up for thalassemia as an outpatient if this has not already 
been done.  She is at baseline exquisitely tender chronically to touch, 
including in the abdomen.  She was receiving Percocet by the time of discharge. 
She received a [place] perioperatively to help control her pain with movements 
and that has now been removed.  Her blood pressure while on her three home 
medications dipped down to the 90s to 100s and her creatinine bumped from 0.8 
to 1.1 on hospital day number three.  She received some maintenance IV fluids 
for one day and her creatinine on discharge is down to 0.7, on admission it was 
0.8. Her A1c is 7.5.  Her Losartan and Hydrochlorothiazide are not being 
continued on discharge, and continue her Metoprolol.  Blood pressures on that 
regimen have been around the 120s.

 

DISCHARGE MEDICATIONS:

1.  Lovenox 30 mg subcu for 27 additional days, a total of a 30 day course.

2.  Metoprolol 25 mg p.o. b.i.d.

3.  Prilosec 40 mg p.o. daily.

4.  Docusate 100 mg p.o. b.i.d. (new).

5.  Meclizine 25 mg p.o. t.i.d.

6.  Percocet one tab p.o. q.4 hours prn (new).

7.  Pioglitazone (Actos) 30 mg p.o. daily.

8.  Polyethylene Glycol 17 gm p.o. daily (new).

9.  Senna one tab p.o. daily (new).

 

DIET:  Carbohydrate consistent heart healthy, unchanged.

 

ACTIVITY LEVEL:  The patient is toe touch weightbearing on the left leg.

 

FOLLOW-UP:  Please follow-up with Dr. Atkinson while at [facility]; he is 
her primary care provider.  Dr. Lavell Ibarra from Orthopedics should be seen 
within 10 to 14 days.  Consideration to do hemoglobin electrophoresis to work-
up her anemia with profound microcytosis if this has not already been 
considered.

 

Time spent on this discharge was 35 minutes.

 

 [id number]/[facility] #: [id number]

DELFINA

## 2020-01-21 ENCOUNTER — HOSPITAL ENCOUNTER (OUTPATIENT)
Dept: HOSPITAL 25 - OR | Age: 85
Discharge: NURSING, REHAB OR LONG-TERM CARE | End: 2020-01-21
Attending: PLASTIC SURGERY
Payer: MEDICARE

## 2020-01-21 VITALS — DIASTOLIC BLOOD PRESSURE: 64 MMHG | SYSTOLIC BLOOD PRESSURE: 121 MMHG

## 2020-01-21 DIAGNOSIS — K21.9: ICD-10-CM

## 2020-01-21 DIAGNOSIS — I10: ICD-10-CM

## 2020-01-21 DIAGNOSIS — D04.61: Primary | ICD-10-CM

## 2020-01-21 DIAGNOSIS — R26.9: ICD-10-CM

## 2020-01-21 DIAGNOSIS — Z79.4: ICD-10-CM

## 2020-01-21 DIAGNOSIS — E11.9: ICD-10-CM

## 2020-01-21 PROCEDURE — 88305 TISSUE EXAM BY PATHOLOGIST: CPT

## 2020-01-21 PROCEDURE — 88329 PATH CONSLTJ DRG SURG: CPT
